# Patient Record
Sex: FEMALE | Race: WHITE | Employment: OTHER | URBAN - METROPOLITAN AREA
[De-identification: names, ages, dates, MRNs, and addresses within clinical notes are randomized per-mention and may not be internally consistent; named-entity substitution may affect disease eponyms.]

---

## 2018-06-04 ENCOUNTER — HOSPITAL ENCOUNTER (OUTPATIENT)
Dept: MAMMOGRAPHY | Age: 68
Discharge: HOME | End: 2018-06-04
Attending: FAMILY MEDICINE
Payer: MEDICARE

## 2018-06-04 DIAGNOSIS — Z13.820 SCREENING FOR OSTEOPOROSIS: ICD-10-CM

## 2018-06-04 DIAGNOSIS — M89.9 DISORDER OF BONE: ICD-10-CM

## 2018-06-04 DIAGNOSIS — M85.80 OSTEOPENIA, UNSPECIFIED LOCATION: ICD-10-CM

## 2018-06-04 PROCEDURE — 77080 DXA BONE DENSITY AXIAL: CPT

## 2018-08-20 ENCOUNTER — HOSPITAL ENCOUNTER (OUTPATIENT)
Dept: CT IMAGING | Age: 68
Discharge: HOME | End: 2018-08-20
Attending: FAMILY MEDICINE
Payer: MEDICARE

## 2018-08-20 DIAGNOSIS — G44.319 ACUTE POST-TRAUMATIC HEADACHE, NOT INTRACTABLE: ICD-10-CM

## 2018-08-20 PROCEDURE — 70450 CT HEAD/BRAIN W/O DYE: CPT

## 2019-05-09 ENCOUNTER — HOSPITAL ENCOUNTER (OUTPATIENT)
Dept: LAB | Age: 69
Discharge: HOME | End: 2019-05-09
Payer: MEDICARE

## 2019-05-09 LAB
ALBUMIN SERPL-MCNC: 3.7 G/DL (ref 3.2–4.6)
ALBUMIN/GLOB SERPL: 1.2 {RATIO} (ref 1.2–3.5)
ALP SERPL-CCNC: 53 U/L (ref 50–136)
ALT SERPL-CCNC: 26 U/L (ref 12–65)
AST SERPL-CCNC: 23 U/L (ref 15–37)
BILIRUB DIRECT SERPL-MCNC: 0.3 MG/DL
BILIRUB SERPL-MCNC: 1.1 MG/DL (ref 0.2–1.1)
GLOBULIN SER CALC-MCNC: 3.2 G/DL (ref 2.3–3.5)
PROT SERPL-MCNC: 6.9 G/DL (ref 6.3–8.2)

## 2019-05-09 PROCEDURE — 36415 COLL VENOUS BLD VENIPUNCTURE: CPT

## 2019-05-09 PROCEDURE — 80076 HEPATIC FUNCTION PANEL: CPT

## 2019-07-03 ENCOUNTER — HOSPITAL ENCOUNTER (OUTPATIENT)
Dept: LAB | Age: 69
Discharge: HOME | End: 2019-07-03
Payer: MEDICARE

## 2019-07-03 LAB
ALBUMIN SERPL-MCNC: 3.5 G/DL (ref 3.2–4.6)
ALBUMIN/GLOB SERPL: 1.1 {RATIO} (ref 1.2–3.5)
ALP SERPL-CCNC: 54 U/L (ref 50–136)
ALT SERPL-CCNC: 22 U/L (ref 12–65)
AST SERPL-CCNC: 22 U/L (ref 15–37)
BILIRUB DIRECT SERPL-MCNC: 0.2 MG/DL
BILIRUB SERPL-MCNC: 1.1 MG/DL (ref 0.2–1.1)
GLOBULIN SER CALC-MCNC: 3.3 G/DL (ref 2.3–3.5)
PROT SERPL-MCNC: 6.8 G/DL (ref 6.3–8.2)

## 2019-07-03 PROCEDURE — 80076 HEPATIC FUNCTION PANEL: CPT

## 2019-07-03 PROCEDURE — 36415 COLL VENOUS BLD VENIPUNCTURE: CPT

## 2020-12-10 PROBLEM — H69.90 EUSTACHIAN TUBE DISORDER: Status: ACTIVE | Noted: 2020-12-10

## 2021-01-14 ENCOUNTER — HOSPITAL ENCOUNTER (OUTPATIENT)
Dept: MAMMOGRAPHY | Age: 71
Discharge: HOME | End: 2021-01-14
Attending: INTERNAL MEDICINE
Payer: MEDICARE

## 2021-01-14 DIAGNOSIS — Z78.0 POSTMENOPAUSAL STATE: ICD-10-CM

## 2021-01-14 PROCEDURE — 77080 DXA BONE DENSITY AXIAL: CPT

## 2021-01-20 NOTE — PROGRESS NOTES
Please call the patient regarding her abnormal result. DEXA scan shows Osteoporosis at the hip. Please refer patient to [facility] for management of Osteoporosis if ok with patient.

## 2021-03-11 PROBLEM — M81.0 AGE-RELATED OSTEOPOROSIS WITHOUT CURRENT PATHOLOGICAL FRACTURE: Status: ACTIVE | Noted: 2021-03-11

## 2021-05-03 ENCOUNTER — HOSPITAL ENCOUNTER (EMERGENCY)
Age: 71
Discharge: HOME | End: 2021-05-03
Attending: EMERGENCY MEDICINE
Payer: MEDICARE

## 2021-05-03 VITALS
HEIGHT: 63 IN | TEMPERATURE: 99.9 F | SYSTOLIC BLOOD PRESSURE: 123 MMHG | HEART RATE: 86 BPM | RESPIRATION RATE: 18 BRPM | OXYGEN SATURATION: 97 % | WEIGHT: 102 LBS | DIASTOLIC BLOOD PRESSURE: 67 MMHG | BODY MASS INDEX: 18.07 KG/M2

## 2021-05-03 DIAGNOSIS — R19.7 DIARRHEA, UNSPECIFIED TYPE: Primary | ICD-10-CM

## 2021-05-03 LAB
ALBUMIN SERPL-MCNC: 4 G/DL (ref 3.2–4.6)
ALBUMIN/GLOB SERPL: 1.3 {RATIO} (ref 1.2–3.5)
ALP SERPL-CCNC: 53 U/L (ref 50–136)
ALT SERPL-CCNC: 13 U/L (ref 12–65)
ANION GAP SERPL CALC-SCNC: 6 MMOL/L (ref 7–16)
AST SERPL-CCNC: 20 U/L (ref 15–37)
BASOPHILS # BLD: 0 K/UL (ref 0–0.2)
BASOPHILS NFR BLD: 0 % (ref 0–2)
BILIRUB SERPL-MCNC: 1.8 MG/DL (ref 0.2–1.1)
BUN SERPL-MCNC: 11 MG/DL (ref 8–23)
CALCIUM SERPL-MCNC: 8.7 MG/DL (ref 8.3–10.4)
CHLORIDE SERPL-SCNC: 108 MMOL/L (ref 98–107)
CO2 SERPL-SCNC: 28 MMOL/L (ref 21–32)
CREAT SERPL-MCNC: 0.6 MG/DL (ref 0.6–1)
DIFFERENTIAL METHOD BLD: ABNORMAL
EOSINOPHIL # BLD: 0 K/UL (ref 0–0.8)
EOSINOPHIL NFR BLD: 0 % (ref 0.5–7.8)
ERYTHROCYTE [DISTWIDTH] IN BLOOD BY AUTOMATED COUNT: 12.6 % (ref 11.9–14.6)
GLOBULIN SER CALC-MCNC: 3 G/DL (ref 2.3–3.5)
GLUCOSE SERPL-MCNC: 91 MG/DL (ref 65–100)
HCT VFR BLD AUTO: 41.4 % (ref 35.8–46.3)
HGB BLD-MCNC: 13.8 G/DL (ref 11.7–15.4)
IMM GRANULOCYTES # BLD AUTO: 0 K/UL (ref 0–0.5)
IMM GRANULOCYTES NFR BLD AUTO: 0 % (ref 0–5)
LIPASE SERPL-CCNC: 148 U/L (ref 73–393)
LYMPHOCYTES # BLD: 0.9 K/UL (ref 0.5–4.6)
LYMPHOCYTES NFR BLD: 12 % (ref 13–44)
MCH RBC QN AUTO: 32 PG (ref 26.1–32.9)
MCHC RBC AUTO-ENTMCNC: 33.3 G/DL (ref 31.4–35)
MCV RBC AUTO: 96.1 FL (ref 79.6–97.8)
MONOCYTES # BLD: 0.4 K/UL (ref 0.1–1.3)
MONOCYTES NFR BLD: 5 % (ref 4–12)
NEUTS SEG # BLD: 6.2 K/UL (ref 1.7–8.2)
NEUTS SEG NFR BLD: 82 % (ref 43–78)
NRBC # BLD: 0 K/UL (ref 0–0.2)
PLATELET # BLD AUTO: 237 K/UL (ref 150–450)
PMV BLD AUTO: 9.8 FL (ref 9.4–12.3)
POTASSIUM SERPL-SCNC: 3.7 MMOL/L (ref 3.5–5.1)
PROT SERPL-MCNC: 7 G/DL (ref 6.3–8.2)
RBC # BLD AUTO: 4.31 M/UL (ref 4.05–5.2)
SODIUM SERPL-SCNC: 142 MMOL/L (ref 136–145)
WBC # BLD AUTO: 7.6 K/UL (ref 4.3–11.1)

## 2021-05-03 PROCEDURE — 74011250636 HC RX REV CODE- 250/636: Performed by: PHYSICIAN ASSISTANT

## 2021-05-03 PROCEDURE — 96361 HYDRATE IV INFUSION ADD-ON: CPT

## 2021-05-03 PROCEDURE — 83690 ASSAY OF LIPASE: CPT

## 2021-05-03 PROCEDURE — 81003 URINALYSIS AUTO W/O SCOPE: CPT

## 2021-05-03 PROCEDURE — 99283 EMERGENCY DEPT VISIT LOW MDM: CPT

## 2021-05-03 PROCEDURE — 96374 THER/PROPH/DIAG INJ IV PUSH: CPT

## 2021-05-03 PROCEDURE — 80053 COMPREHEN METABOLIC PANEL: CPT

## 2021-05-03 PROCEDURE — 85025 COMPLETE CBC W/AUTO DIFF WBC: CPT

## 2021-05-03 RX ORDER — ONDANSETRON HYDROCHLORIDE 8 MG/1
8 TABLET, FILM COATED ORAL
Qty: 30 TAB | Refills: 1 | Status: SHIPPED | OUTPATIENT
Start: 2021-05-03

## 2021-05-03 RX ORDER — ONDANSETRON 2 MG/ML
4 INJECTION INTRAMUSCULAR; INTRAVENOUS
Status: COMPLETED | OUTPATIENT
Start: 2021-05-03 | End: 2021-05-03

## 2021-05-03 RX ADMIN — SODIUM CHLORIDE 1000 ML: 900 INJECTION, SOLUTION INTRAVENOUS at 12:47

## 2021-05-03 RX ADMIN — ONDANSETRON 4 MG: 2 INJECTION INTRAMUSCULAR; INTRAVENOUS at 12:47

## 2021-05-03 NOTE — DISCHARGE INSTRUCTIONS
Use at home meds as directed, push fluids try to eat well-balanced diet see your primary care physician for routine recheck

## 2021-05-03 NOTE — ED NOTES
I have reviewed discharge instructions with the patient. The patient verbalized understanding. Patient left ED via Discharge Method: ambulatory to Home with self    Opportunity for questions and clarification provided. Patient given 1 scripts. To continue your aftercare when you leave the hospital, you may receive an automated call from our care team to check in on how you are doing. This is a free service and part of our promise to provide the best care and service to meet your aftercare needs.  If you have questions, or wish to unsubscribe from this service please call [phone]. Thank you for Choosing our [address] [place] [address] Emergency Department.

## 2021-05-03 NOTE — ED PROVIDER NOTES
Patient reports increased nervousness nausea over the past 3 days she did have 3 loose stools this morning. She was able to eat crackers only Saturday and Sunday due to nausea, she was able to drink some Gatorade. She denies any fever. She has a history of some anxiety and her physician did recently increase her dose of Lexapro. She states she feels she has been more shaky due to stress from taking care of her elderly mother. She denies any fever, no shortness of breath, no back pain no difficulty urinating no numbness or tingling into the arms or legs. No headache or blurred vision    The history is provided by the patient. Diarrhea   This is a new problem. The current episode started 6 to 12 hours ago. The problem occurs rarely. The problem has not changed since onset. The pain is associated with an unknown factor. The pain is at a severity of 0/10. The patient is experiencing no pain. Associated symptoms include diarrhea and nausea. Pertinent negatives include no vomiting, no dysuria and no frequency. Associated symptoms comments: Shaky . Exacerbated by: stress. The pain is relieved by nothing. Her past medical history does not include gallstones or diverticulitis. Past medical history comments: Gastroparesis. The patient's surgical history includes cholecystectomy.        Past Medical History:   Diagnosis Date    Depression     Gastroparesis     Dr. Contreras Speaks Hearing loss     Dr Vladimir Sam Osteopenia 8/30/2012    Osteoporosis     Otosclerosis     Rheumatic fever     Tinnitus 8/30/2012    Unspecified adverse effect of anesthesia     s/p EGD, c/o of pain in L eye - had to see MD, was told it was due to eyes not being closed during procedure       Past Surgical History:   Procedure Laterality Date    HX CHOLECYSTECTOMY      HX TONSIL AND ADENOIDECTOMY      HX TUBAL LIGATION           Family History:   Problem Relation Age of Onset    Hypertension Mother     Heart Disease Mother         \"clots in heart\"    Macular Degen Mother     Osteoporosis Mother     Heart Disease Father         arrythmia    Prostate Cancer Father     Glaucoma Father     Diabetes Maternal Aunt     Diabetes Maternal Aunt     Stroke Maternal Aunt     Diabetes Maternal Aunt     No Known Problems Maternal Grandmother     No Known Problems Maternal Grandfather     Osteoporosis Paternal Grandmother     No Known Problems Paternal Grandfather     Colon Cancer Neg Hx     Malignant Hyperthermia Neg Hx     Pseudocholinesterase Deficiency Neg Hx     Post-op Nausea/Vomiting Neg Hx     Delayed Awakening Neg Hx     Emergence Delirium Neg Hx     Post-op Cognitive Dysfunction Neg Hx     Other Neg Hx        Social History     Socioeconomic History    Marital status:      Spouse name: Not on file    Number of children: Not on file    Years of education: Not on file    Highest education level: Not on file   Occupational History    Not on file   Social Needs    Financial resource strain: Not on file    Food insecurity     Worry: Not on file     Inability: Not on file    Transportation needs     Medical: Not on file     Non-medical: Not on file   Tobacco Use    Smoking status: Never Smoker    Smokeless tobacco: Never Used   Substance and Sexual Activity    Alcohol use: No    Drug use: No    Sexual activity: Never     Birth control/protection: None   Lifestyle    Physical activity     Days per week: Not on file     Minutes per session: Not on file    Stress: Not on file   Relationships    Social connections     Talks on phone: Not on file     Gets together: Not on file     Attends Yarsani service: Not on file     Active member of club or organization: Not on file     Attends meetings of clubs or organizations: Not on file     Relationship status: Not on file    Intimate partner violence     Fear of current or ex partner: Not on file     Emotionally abused: Not on file     Physically abused: Not on file Forced sexual activity: Not on file   Other Topics Concern    Not on file   Social History Narrative    Retired// 2 children         ALLERGIES: Macrolide antibiotics, Metoclopramide hcl, Penicillin, Adhesive, Clarithromycin, Erythromycin, Penicillins, and Reglan [metoclopramide]    Review of Systems   Gastrointestinal: Positive for diarrhea and nausea. Negative for vomiting. Genitourinary: Negative for dysuria and frequency. All other systems reviewed and are negative. Vitals:    05/03/21 1104   BP: 123/67   Pulse: 86   Resp: 18   Temp: 99.9 °F (37.7 °C)   SpO2: 97%   Weight: 46.3 kg (102 lb)   Height: 5' 3\" (1.6 m)            Physical Exam  Vitals signs and nursing note reviewed. Constitutional:       General: She is not in acute distress. Appearance: Normal appearance. She is well-developed. She is not diaphoretic. Comments: anxious   HENT:      Head: Normocephalic and atraumatic. Right Ear: Tympanic membrane normal.      Left Ear: Tympanic membrane normal.      Mouth/Throat:      Mouth: Mucous membranes are moist.   Eyes:      Pupils: Pupils are equal, round, and reactive to light. Neck:      Musculoskeletal: Normal range of motion and neck supple. Cardiovascular:      Rate and Rhythm: Normal rate and regular rhythm. Pulmonary:      Effort: Pulmonary effort is normal.      Breath sounds: Normal breath sounds. No wheezing or rhonchi. Abdominal:      General: Abdomen is flat. Bowel sounds are normal.      Palpations: Abdomen is soft. Tenderness: There is no abdominal tenderness. Hernia: No hernia is present. Musculoskeletal: Normal range of motion. Skin:     General: Skin is warm. Neurological:      General: No focal deficit present. Mental Status: She is alert and oriented to person, place, and time.           MDM  Number of Diagnoses or Management Options  Diagnosis management comments: Cbc, cmp, lipase normal,pt feeling better s/p 1 liter ns and 4 mg zofran,no stools in er, no fever  Pt  Requests rx for zofran pills not odt, pt to see pmd for recheck       Amount and/or Complexity of Data Reviewed  Clinical lab tests: ordered and reviewed  Review and summarize past medical records: yes    Risk of Complications, Morbidity, and/or Mortality  Presenting problems: moderate  Diagnostic procedures: moderate  Management options: low    Patient Progress  Patient progress: improved         Procedures

## 2021-08-11 ENCOUNTER — APPOINTMENT (RX ONLY)
Dept: URBAN - METROPOLITAN AREA CLINIC 24 | Facility: CLINIC | Age: 71
Setting detail: DERMATOLOGY
End: 2021-08-11

## 2021-08-11 DIAGNOSIS — D18.0 HEMANGIOMA: ICD-10-CM

## 2021-08-11 DIAGNOSIS — L57.8 OTHER SKIN CHANGES DUE TO CHRONIC EXPOSURE TO NONIONIZING RADIATION: ICD-10-CM

## 2021-08-11 DIAGNOSIS — L82.1 OTHER SEBORRHEIC KERATOSIS: ICD-10-CM

## 2021-08-11 DIAGNOSIS — L72.0 EPIDERMAL CYST: ICD-10-CM

## 2021-08-11 DIAGNOSIS — D22 MELANOCYTIC NEVI: ICD-10-CM

## 2021-08-11 PROBLEM — D18.01 HEMANGIOMA OF SKIN AND SUBCUTANEOUS TISSUE: Status: ACTIVE | Noted: 2021-08-11

## 2021-08-11 PROBLEM — D22.4 MELANOCYTIC NEVI OF SCALP AND NECK: Status: ACTIVE | Noted: 2021-08-11

## 2021-08-11 PROCEDURE — ? PRESCRIPTION

## 2021-08-11 PROCEDURE — ? TREATMENT REGIMEN

## 2021-08-11 PROCEDURE — ? COUNSELING

## 2021-08-11 PROCEDURE — 99203 OFFICE O/P NEW LOW 30 MIN: CPT

## 2021-08-11 PROCEDURE — ? RECOMMENDATIONS

## 2021-08-11 RX ORDER — TRETIONIN 0.25 MG/G
CREAM TOPICAL
Qty: 1 | Refills: 7 | Status: ERX | COMMUNITY
Start: 2021-08-11

## 2021-08-11 RX ADMIN — TRETIONIN: 0.25 CREAM TOPICAL at 00:00

## 2021-08-11 ASSESSMENT — LOCATION DETAILED DESCRIPTION DERM
LOCATION DETAILED: RIGHT MEDIAL FOREHEAD
LOCATION DETAILED: LEFT SUPERIOR FOREHEAD
LOCATION DETAILED: RIGHT INFERIOR FOREHEAD
LOCATION DETAILED: RIGHT RIB CAGE
LOCATION DETAILED: LEFT SUPERIOR MEDIAL MIDBACK
LOCATION DETAILED: RIGHT DISTAL PRETIBIAL REGION
LOCATION DETAILED: RIGHT FOREHEAD
LOCATION DETAILED: RIGHT MID-UPPER BACK
LOCATION DETAILED: LEFT FOREHEAD
LOCATION DETAILED: LEFT MEDIAL FOREHEAD
LOCATION DETAILED: LEFT INFERIOR LATERAL FOREHEAD
LOCATION DETAILED: SUBXIPHOID
LOCATION DETAILED: MID TRAPEZIAL NECK

## 2021-08-11 ASSESSMENT — LOCATION SIMPLE DESCRIPTION DERM
LOCATION SIMPLE: RIGHT UPPER BACK
LOCATION SIMPLE: ABDOMEN
LOCATION SIMPLE: RIGHT PRETIBIAL REGION
LOCATION SIMPLE: LEFT LOWER BACK
LOCATION SIMPLE: TRAPEZIAL NECK
LOCATION SIMPLE: LEFT FOREHEAD
LOCATION SIMPLE: RIGHT FOREHEAD

## 2021-08-11 ASSESSMENT — LOCATION ZONE DERM
LOCATION ZONE: TRUNK
LOCATION ZONE: NECK
LOCATION ZONE: LEG
LOCATION ZONE: FACE

## 2021-08-11 NOTE — PROCEDURE: RECOMMENDATIONS
Detail Level: Zone
Render Risk Assessment In Note?: no
Recommendation Preamble: The following recommendations were made during the visit: see [facility] for treatment options, can try Tretinoin cream nightly

## 2021-08-11 NOTE — PROCEDURE: TREATMENT REGIMEN
Initiate Treatment: Tretinoin cream 0.025% every other night. Use Cetaphil or CeraVe
Detail Level: Zone

## 2022-03-18 PROBLEM — H69.90 EUSTACHIAN TUBE DISORDER: Status: ACTIVE | Noted: 2020-12-10

## 2022-03-20 PROBLEM — M81.0 AGE-RELATED OSTEOPOROSIS WITHOUT CURRENT PATHOLOGICAL FRACTURE: Status: ACTIVE | Noted: 2021-03-11

## 2022-03-25 LAB — MAMMOGRAPHY, EXTERNAL: NORMAL

## 2022-06-17 ENCOUNTER — APPOINTMENT (RX ONLY)
Dept: URBAN - METROPOLITAN AREA CLINIC 23 | Facility: CLINIC | Age: 72
Setting detail: DERMATOLOGY
End: 2022-06-17

## 2022-06-17 DIAGNOSIS — Z41.9 ENCOUNTER FOR PROCEDURE FOR PURPOSES OTHER THAN REMEDYING HEALTH STATE, UNSPECIFIED: ICD-10-CM

## 2022-06-17 PROCEDURE — ? ACNE SURGERY

## 2022-06-17 PROCEDURE — 10040 EXTRACTION: CPT

## 2022-06-17 ASSESSMENT — LOCATION DETAILED DESCRIPTION DERM
LOCATION DETAILED: RIGHT MEDIAL FOREHEAD
LOCATION DETAILED: LEFT INFERIOR FOREHEAD
LOCATION DETAILED: LEFT INFERIOR CENTRAL MALAR CHEEK
LOCATION DETAILED: RIGHT INFERIOR CENTRAL MALAR CHEEK

## 2022-06-17 ASSESSMENT — LOCATION SIMPLE DESCRIPTION DERM
LOCATION SIMPLE: RIGHT CHEEK
LOCATION SIMPLE: LEFT FOREHEAD
LOCATION SIMPLE: RIGHT FOREHEAD
LOCATION SIMPLE: LEFT CHEEK

## 2022-06-17 ASSESSMENT — LOCATION ZONE DERM: LOCATION ZONE: FACE

## 2022-06-17 NOTE — PROCEDURE: ACNE SURGERY
Render Post-Care Instructions In Note?: no
Consent was obtained and risks were reviewed including but not limited to scarring, infection, bleeding, scabbing, incomplete removal, and allergy to anesthesia.
Extraction Method: 11 blade and comedo extractor
Acne Type: Comedonal Lesions
Detail Level: Detailed
Prep Text (Optional): Prior to removal the treatment areas were prepped in the usual\\nextractions millia
Post-Care Instructions: I reviewed with the patient in detail post-care instructions. Patient is to keep the treatment areas dry overnight, and then apply bacitracin twice daily until healed. Patient may apply hydrogen peroxide soaks to remove any crusting.

## 2022-06-17 NOTE — HPI: COSMETIC (LASER RED SPOTS)
Additional History: exractions on severe millia \\nforehead was great but couldnt get out alot on lower face\\nsuggested 8-10 weeks

## 2022-07-27 ENCOUNTER — TELEPHONE (OUTPATIENT)
Dept: INTERNAL MEDICINE CLINIC | Facility: CLINIC | Age: 72
End: 2022-07-27

## 2022-07-27 NOTE — TELEPHONE ENCOUNTER
The patient stated that she has been exposed to COVID-19 twice. 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 She doesn't know what to do. Please call her back at [phone].

## 2022-08-10 ENCOUNTER — TELEPHONE (OUTPATIENT)
Dept: INTERNAL MEDICINE CLINIC | Facility: CLINIC | Age: 72
End: 2022-08-10

## 2022-08-10 DIAGNOSIS — E78.49 OTHER HYPERLIPIDEMIA: ICD-10-CM

## 2022-08-10 DIAGNOSIS — F41.8 ANXIETY WITH DEPRESSION: Primary | ICD-10-CM

## 2022-08-16 DIAGNOSIS — E78.49 OTHER HYPERLIPIDEMIA: ICD-10-CM

## 2022-08-16 DIAGNOSIS — F41.8 ANXIETY WITH DEPRESSION: ICD-10-CM

## 2022-08-16 LAB
ALBUMIN SERPL-MCNC: 3.6 G/DL (ref 3.2–4.6)
ALBUMIN/GLOB SERPL: 1.2 {RATIO} (ref 1.2–3.5)
ALP SERPL-CCNC: 51 U/L (ref 50–136)
ALT SERPL-CCNC: 20 U/L (ref 12–65)
ANION GAP SERPL CALC-SCNC: ABNORMAL MMOL/L (ref 7–16)
AST SERPL-CCNC: 22 U/L (ref 15–37)
BASOPHILS # BLD: 0.1 K/UL (ref 0–0.2)
BASOPHILS NFR BLD: 1 % (ref 0–2)
BILIRUB SERPL-MCNC: 0.8 MG/DL (ref 0.2–1.1)
BUN SERPL-MCNC: 14 MG/DL (ref 8–23)
CALCIUM SERPL-MCNC: 8.9 MG/DL (ref 8.3–10.4)
CHLORIDE SERPL-SCNC: 111 MMOL/L (ref 98–107)
CHOLEST SERPL-MCNC: 193 MG/DL
CO2 SERPL-SCNC: 31 MMOL/L (ref 21–32)
CREAT SERPL-MCNC: 0.7 MG/DL (ref 0.6–1)
DIFFERENTIAL METHOD BLD: ABNORMAL
EOSINOPHIL # BLD: 0.3 K/UL (ref 0–0.8)
EOSINOPHIL NFR BLD: 4 % (ref 0.5–7.8)
ERYTHROCYTE [DISTWIDTH] IN BLOOD BY AUTOMATED COUNT: 13.1 % (ref 11.9–14.6)
GLOBULIN SER CALC-MCNC: 2.9 G/DL (ref 2.3–3.5)
GLUCOSE SERPL-MCNC: 87 MG/DL (ref 65–100)
HCT VFR BLD AUTO: 40.6 % (ref 35.8–46.3)
HDLC SERPL-MCNC: 70 MG/DL (ref 40–60)
HDLC SERPL: 2.8 {RATIO}
HGB BLD-MCNC: 13.1 G/DL (ref 11.7–15.4)
IMM GRANULOCYTES # BLD AUTO: 0 K/UL (ref 0–0.5)
IMM GRANULOCYTES NFR BLD AUTO: 0 % (ref 0–5)
LDLC SERPL CALC-MCNC: 108.4 MG/DL
LYMPHOCYTES # BLD: 1.5 K/UL (ref 0.5–4.6)
LYMPHOCYTES NFR BLD: 24 % (ref 13–44)
MCH RBC QN AUTO: 32.6 PG (ref 26.1–32.9)
MCHC RBC AUTO-ENTMCNC: 32.3 G/DL (ref 31.4–35)
MCV RBC AUTO: 101 FL (ref 79.6–97.8)
MONOCYTES # BLD: 0.6 K/UL (ref 0.1–1.3)
MONOCYTES NFR BLD: 10 % (ref 4–12)
NEUTS SEG # BLD: 3.9 K/UL (ref 1.7–8.2)
NEUTS SEG NFR BLD: 61 % (ref 43–78)
NRBC # BLD: 0 K/UL (ref 0–0.2)
PLATELET # BLD AUTO: 236 K/UL (ref 150–450)
PMV BLD AUTO: 10 FL (ref 9.4–12.3)
POTASSIUM SERPL-SCNC: 3.7 MMOL/L (ref 3.5–5.1)
PROT SERPL-MCNC: 6.5 G/DL (ref 6.3–8.2)
RBC # BLD AUTO: 4.02 M/UL (ref 4.05–5.2)
SODIUM SERPL-SCNC: 138 MMOL/L (ref 136–145)
TRIGL SERPL-MCNC: 73 MG/DL (ref 35–150)
VLDLC SERPL CALC-MCNC: 14.6 MG/DL (ref 6–23)
WBC # BLD AUTO: 6.3 K/UL (ref 4.3–11.1)

## 2022-08-18 ENCOUNTER — APPOINTMENT (RX ONLY)
Dept: URBAN - METROPOLITAN AREA CLINIC 23 | Facility: CLINIC | Age: 72
Setting detail: DERMATOLOGY
End: 2022-08-18

## 2022-08-18 DIAGNOSIS — Z41.9 ENCOUNTER FOR PROCEDURE FOR PURPOSES OTHER THAN REMEDYING HEALTH STATE, UNSPECIFIED: ICD-10-CM

## 2022-08-18 PROCEDURE — ? ACNE SURGERY

## 2022-08-18 PROCEDURE — 10040 EXTRACTION: CPT

## 2022-08-18 ASSESSMENT — LOCATION DETAILED DESCRIPTION DERM
LOCATION DETAILED: LEFT INFERIOR CENTRAL MALAR CHEEK
LOCATION DETAILED: LEFT FOREHEAD
LOCATION DETAILED: LEFT CHIN
LOCATION DETAILED: RIGHT INFERIOR CENTRAL MALAR CHEEK
LOCATION DETAILED: RIGHT FOREHEAD

## 2022-08-18 ASSESSMENT — LOCATION SIMPLE DESCRIPTION DERM
LOCATION SIMPLE: CHIN
LOCATION SIMPLE: RIGHT CHEEK
LOCATION SIMPLE: LEFT CHEEK
LOCATION SIMPLE: LEFT FOREHEAD
LOCATION SIMPLE: RIGHT FOREHEAD

## 2022-08-18 ASSESSMENT — LOCATION ZONE DERM: LOCATION ZONE: FACE

## 2022-08-18 NOTE — PROCEDURE: ACNE SURGERY
Render Post-Care Instructions In Note?: no
Post-Care Instructions: I reviewed with the patient in detail post-care instructions. Patient is to keep the treatment areas dry overnight, and then apply bacitracin twice daily until healed. Patient may apply hydrogen peroxide soaks to remove any crusting.
Detail Level: Detailed
Consent was obtained and risks were reviewed including but not limited to scarring, infection, bleeding, scabbing, incomplete removal, and allergy to anesthesia.
Prep Text (Optional): Prior to removal the treatment areas were prepped in the usual\\nextractions millia /cyst
Extraction Method: 11 blade and comedo extractor
Acne Type: Comedonal Lesions

## 2022-08-18 NOTE — HPI: COSMETIC (LASER RED SPOTS)
Additional History: USED 11 BLADE AND STERILE QTIPS\\nFOREHEAD EXTRACTED EASIER THAN LOWER FACE\\nSOME MILLIA HAVE FORMED INTO MEAN DEEP CYYSTS\\n\\nDID SUGGEST PLASTIC\\nBUT WANT HER BACK ON TRET\\n gave her samples of elta cleanser and skin reocvery light oil free moisuzier\\nwnat her to stop using all her current hair and face products\\nceterphil should be fine\\nbut told her for to be plain landy with everythingelse \\ndid get some dark spots possible from extractions\\n\\nif she gets more do not do any more extractions!

## 2022-08-26 ENCOUNTER — OFFICE VISIT (OUTPATIENT)
Dept: INTERNAL MEDICINE CLINIC | Facility: CLINIC | Age: 72
End: 2022-08-26
Payer: MEDICARE

## 2022-08-26 VITALS
HEART RATE: 75 BPM | BODY MASS INDEX: 18.25 KG/M2 | SYSTOLIC BLOOD PRESSURE: 106 MMHG | DIASTOLIC BLOOD PRESSURE: 60 MMHG | OXYGEN SATURATION: 98 % | WEIGHT: 103 LBS

## 2022-08-26 DIAGNOSIS — F41.8 ANXIETY WITH DEPRESSION: Primary | ICD-10-CM

## 2022-08-26 DIAGNOSIS — E78.49 OTHER HYPERLIPIDEMIA: ICD-10-CM

## 2022-08-26 DIAGNOSIS — K31.84 GASTROPARESIS: ICD-10-CM

## 2022-08-26 PROCEDURE — G8399 PT W/DXA RESULTS DOCUMENT: HCPCS | Performed by: INTERNAL MEDICINE

## 2022-08-26 PROCEDURE — 3017F COLORECTAL CA SCREEN DOC REV: CPT | Performed by: INTERNAL MEDICINE

## 2022-08-26 PROCEDURE — 1036F TOBACCO NON-USER: CPT | Performed by: INTERNAL MEDICINE

## 2022-08-26 PROCEDURE — G8419 CALC BMI OUT NRM PARAM NOF/U: HCPCS | Performed by: INTERNAL MEDICINE

## 2022-08-26 PROCEDURE — 99214 OFFICE O/P EST MOD 30 MIN: CPT | Performed by: INTERNAL MEDICINE

## 2022-08-26 PROCEDURE — 1123F ACP DISCUSS/DSCN MKR DOCD: CPT | Performed by: INTERNAL MEDICINE

## 2022-08-26 PROCEDURE — 1090F PRES/ABSN URINE INCON ASSESS: CPT | Performed by: INTERNAL MEDICINE

## 2022-08-26 PROCEDURE — G8427 DOCREV CUR MEDS BY ELIG CLIN: HCPCS | Performed by: INTERNAL MEDICINE

## 2022-08-26 RX ORDER — TRIAMCINOLONE ACETONIDE 10 MG/ML
10 INJECTION, SUSPENSION INTRA-ARTICULAR; INTRALESIONAL ONCE
COMMUNITY

## 2022-08-26 RX ORDER — PAROXETINE 30 MG/1
30 TABLET, FILM COATED ORAL DAILY
Qty: 90 TABLET | Refills: 3 | Status: SHIPPED | OUTPATIENT
Start: 2022-08-26

## 2022-08-26 ASSESSMENT — PATIENT HEALTH QUESTIONNAIRE - PHQ9
2. FEELING DOWN, DEPRESSED OR HOPELESS: 0
SUM OF ALL RESPONSES TO PHQ QUESTIONS 1-9: 0
4. FEELING TIRED OR HAVING LITTLE ENERGY: 0
8. MOVING OR SPEAKING SO SLOWLY THAT OTHER PEOPLE COULD HAVE NOTICED. OR THE OPPOSITE, BEING SO FIGETY OR RESTLESS THAT YOU HAVE BEEN MOVING AROUND A LOT MORE THAN USUAL: 0
6. FEELING BAD ABOUT YOURSELF - OR THAT YOU ARE A FAILURE OR HAVE LET YOURSELF OR YOUR FAMILY DOWN: 0
7. TROUBLE CONCENTRATING ON THINGS, SUCH AS READING THE NEWSPAPER OR WATCHING TELEVISION: 0
SUM OF ALL RESPONSES TO PHQ QUESTIONS 1-9: 0
1. LITTLE INTEREST OR PLEASURE IN DOING THINGS: 0
5. POOR APPETITE OR OVEREATING: 0
9. THOUGHTS THAT YOU WOULD BE BETTER OFF DEAD, OR OF HURTING YOURSELF: 0
SUM OF ALL RESPONSES TO PHQ9 QUESTIONS 1 & 2: 0
3. TROUBLE FALLING OR STAYING ASLEEP: 0
10. IF YOU CHECKED OFF ANY PROBLEMS, HOW DIFFICULT HAVE THESE PROBLEMS MADE IT FOR YOU TO DO YOUR WORK, TAKE CARE OF THINGS AT HOME, OR GET ALONG WITH OTHER PEOPLE: 0

## 2022-08-26 ASSESSMENT — ENCOUNTER SYMPTOMS
COUGH: 0
SHORTNESS OF BREATH: 0
ABDOMINAL PAIN: 0

## 2022-08-26 NOTE — PROGRESS NOTES
SUBJECTIVE:   Chase Hilliard is a 66 y.o. 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 Pittman [facility]  Has not had colonoscopy yet - plans to call GI for a 2 day prep  Patient wants to wait on Shingles vaccine at this time     Past Medical History, Past Surgical History, Family history, Social History, and Medications were all reviewed with the patient today and updated as necessary. Current Outpatient Medications   Medication Sig Dispense Refill    triamcinolone acetonide (KENALOG) 10 MG/ML injection Inject 10 mg into the articular space once      PARoxetine (PAXIL) 30 MG tablet Take 1 tablet by mouth daily 90 tablet 3    calcium carbonate-vitamin D (CALTRATE) 600-400 MG-UNIT TABS per tab Take 1 tablet by mouth 2 times daily      loratadine (CLARITIN) 10 MG tablet Take 10 mg by mouth daily      ondansetron (ZOFRAN) 8 MG tablet Take 8 mg by mouth      tretinoin (RETIN-A) 0.025 % cream APPLY PEA SIZE AMOUNT TO THE FACE NIGHTLY, START EVERY THIRD NIGHT AND INCREASE AS TOLERATED      zoledronic acid (RECLAST) 5 MG/100ML SOLN Infuse 5 mg intravenously once       No current facility-administered medications for this visit.      Allergies   Allergen Reactions    Metoclopramide Other (See Comments)     CNS reaction(s)  Agitation      Penicillins Rash    Clarithromycin Other (See Comments)     Due to domperidone    Erythromycin Other (See Comments)     Due to domperidone    Adhesive Tape Rash     Patient Active Problem List   Diagnosis    Gastroparesis    Eustachian tube disorder    Osteopenia    IBS (irritable bowel syndrome)    Tinnitus    Anxiety with depression Constitutional:       General: She is not in acute distress. Cardiovascular:      Rate and Rhythm: Normal rate and regular rhythm. Pulmonary:      Effort: Pulmonary effort is normal.      Breath sounds: No wheezing. Neurological:      General: No focal deficit present. Mental Status: She is oriented to person, place, and time. Psychiatric:         Mood and Affect: Mood normal.         Behavior: Behavior normal.       Medical problems and test results were reviewed with the patient today.      Recent Results (from the past 672 hour(s))   Lipid Panel    Collection Time: 08/16/22  3:00 PM   Result Value Ref Range    Cholesterol, Total 193 <200 MG/DL    Triglycerides 73 35 - 150 MG/DL    HDL 70 (H) 40 - 60 MG/DL    LDL Calculated 108.4 (H) <100 MG/DL    VLDL Cholesterol Calculated 14.6 6.0 - 23.0 MG/DL    Chol/HDL Ratio 2.8     CBC with Auto Differential    Collection Time: 08/16/22  3:00 PM   Result Value Ref Range    WBC 6.3 4.3 - 11.1 K/uL    RBC 4.02 (L) 4.05 - 5.2 M/uL    Hemoglobin 13.1 11.7 - 15.4 g/dL    Hematocrit 40.6 35.8 - 46.3 %    .0 (H) 79.6 - 97.8 FL    MCH 32.6 26.1 - 32.9 PG    MCHC 32.3 31.4 - 35.0 g/dL    RDW 13.1 11.9 - 14.6 %    Platelets 688 411 - 171 K/uL    MPV 10.0 9.4 - 12.3 FL    nRBC 0.00 0.0 - 0.2 K/uL    Differential Type AUTOMATED      Seg Neutrophils 61 43 - 78 %    Lymphocytes 24 13 - 44 %    Monocytes 10 4.0 - 12.0 %    Eosinophils % 4 0.5 - 7.8 %    Basophils 1 0.0 - 2.0 %    Immature Granulocytes 0 0.0 - 5.0 %    Segs Absolute 3.9 1.7 - 8.2 K/UL    Absolute Lymph # 1.5 0.5 - 4.6 K/UL    Absolute Mono # 0.6 0.1 - 1.3 K/UL    Absolute Eos # 0.3 0.0 - 0.8 K/UL    Basophils Absolute 0.1 0.0 - 0.2 K/UL    Absolute Immature Granulocyte 0.0 0.0 - 0.5 K/UL   Comprehensive Metabolic Panel    Collection Time: 08/16/22  3:00 PM   Result Value Ref Range    Sodium 138 136 - 145 mmol/L    Potassium 3.7 3.5 - 5.1 mmol/L    Chloride 111 (H) 98 - 107 mmol/L    CO2 31 21 - 32 mmol/L    Anion Gap Cannot be calculated 7 - 16 mmol/L    Glucose 87 65 - 100 mg/dL    BUN 14 8 - 23 MG/DL    Creatinine 0.70 0.6 - 1.0 MG/DL    GFR African American >60 >60 ml/min/1.73m2    GFR Non- >60 >60 ml/min/1.73m2    Calcium 8.9 8.3 - 10.4 MG/DL    Total Bilirubin 0.8 0.2 - 1.1 MG/DL    ALT 20 12 - 65 U/L    AST 22 15 - 37 U/L    Alk Phosphatase 51 50 - 136 U/L    Total Protein 6.5 6.3 - 8.2 g/dL    Albumin 3.6 3.2 - 4.6 g/dL    Globulin 2.9 2.3 - 3.5 g/dL    Albumin/Globulin Ratio 1.2 1.2 - 3.5           ASSESSMENT and PLAN    Elia Sandoval was seen today for follow-up. Diagnoses and all orders for this visit:    Anxiety with depression  -     Comprehensive Metabolic Panel; Future  -     TSH; Future    Other hyperlipidemia  -     Lipid Panel; Future    Gastroparesis  -     CBC with Auto Differential; Future    Other orders  -     PARoxetine (PAXIL) 30 MG tablet; Take 1 tablet by mouth daily        Return in about 6 months (around 2/26/2023) for Medicare Wellness Visit( labs ~ 3 days before).

## 2022-11-03 ENCOUNTER — RX ONLY (OUTPATIENT)
Age: 72
Setting detail: RX ONLY
End: 2022-11-03

## 2022-11-03 RX ORDER — TRETIONIN 0.25 MG/G
CREAM TOPICAL
Qty: 45 | Refills: 6 | Status: ERX

## 2022-11-21 DIAGNOSIS — K31.84 GASTROPARESIS: ICD-10-CM

## 2022-11-21 DIAGNOSIS — F41.8 ANXIETY WITH DEPRESSION: ICD-10-CM

## 2022-11-21 DIAGNOSIS — E78.49 OTHER HYPERLIPIDEMIA: ICD-10-CM

## 2022-11-21 LAB
ALBUMIN SERPL-MCNC: 3.7 G/DL (ref 3.2–4.6)
ALBUMIN/GLOB SERPL: 1.4 {RATIO} (ref 0.4–1.6)
ALP SERPL-CCNC: 47 U/L (ref 50–136)
ALT SERPL-CCNC: 16 U/L (ref 12–65)
ANION GAP SERPL CALC-SCNC: 4 MMOL/L (ref 2–11)
AST SERPL-CCNC: 18 U/L (ref 15–37)
BASOPHILS # BLD: 0 K/UL (ref 0–0.2)
BASOPHILS NFR BLD: 1 % (ref 0–2)
BILIRUB SERPL-MCNC: 1.2 MG/DL (ref 0.2–1.1)
BUN SERPL-MCNC: 18 MG/DL (ref 8–23)
CALCIUM SERPL-MCNC: 9.4 MG/DL (ref 8.3–10.4)
CHLORIDE SERPL-SCNC: 109 MMOL/L (ref 101–110)
CHOLEST SERPL-MCNC: 199 MG/DL
CO2 SERPL-SCNC: 29 MMOL/L (ref 21–32)
CREAT SERPL-MCNC: 0.7 MG/DL (ref 0.6–1)
DIFFERENTIAL METHOD BLD: NORMAL
EOSINOPHIL # BLD: 0.2 K/UL (ref 0–0.8)
EOSINOPHIL NFR BLD: 3 % (ref 0.5–7.8)
ERYTHROCYTE [DISTWIDTH] IN BLOOD BY AUTOMATED COUNT: 12.4 % (ref 11.9–14.6)
GLOBULIN SER CALC-MCNC: 2.7 G/DL (ref 2.8–4.5)
GLUCOSE SERPL-MCNC: 90 MG/DL (ref 65–100)
HCT VFR BLD AUTO: 41.2 % (ref 35.8–46.3)
HDLC SERPL-MCNC: 89 MG/DL (ref 40–60)
HDLC SERPL: 2.2 {RATIO}
HGB BLD-MCNC: 13.2 G/DL (ref 11.7–15.4)
IMM GRANULOCYTES # BLD AUTO: 0 K/UL (ref 0–0.5)
IMM GRANULOCYTES NFR BLD AUTO: 0 % (ref 0–5)
LDLC SERPL CALC-MCNC: 96.8 MG/DL
LYMPHOCYTES # BLD: 1.2 K/UL (ref 0.5–4.6)
LYMPHOCYTES NFR BLD: 20 % (ref 13–44)
MCH RBC QN AUTO: 31.7 PG (ref 26.1–32.9)
MCHC RBC AUTO-ENTMCNC: 32 G/DL (ref 31.4–35)
MCV RBC AUTO: 98.8 FL (ref 82–102)
MONOCYTES # BLD: 0.5 K/UL (ref 0.1–1.3)
MONOCYTES NFR BLD: 9 % (ref 4–12)
NEUTS SEG # BLD: 4 K/UL (ref 1.7–8.2)
NEUTS SEG NFR BLD: 67 % (ref 43–78)
NRBC # BLD: 0 K/UL (ref 0–0.2)
PLATELET # BLD AUTO: 231 K/UL (ref 150–450)
PMV BLD AUTO: 10.6 FL (ref 9.4–12.3)
POTASSIUM SERPL-SCNC: 4 MMOL/L (ref 3.5–5.1)
PROT SERPL-MCNC: 6.4 G/DL (ref 6.3–8.2)
RBC # BLD AUTO: 4.17 M/UL (ref 4.05–5.2)
SODIUM SERPL-SCNC: 142 MMOL/L (ref 133–143)
TRIGL SERPL-MCNC: 66 MG/DL (ref 35–150)
TSH, 3RD GENERATION: 1.6 UIU/ML (ref 0.36–3.74)
VLDLC SERPL CALC-MCNC: 13.2 MG/DL (ref 6–23)
WBC # BLD AUTO: 5.8 K/UL (ref 4.3–11.1)

## 2022-11-22 DIAGNOSIS — R17 ELEVATED BILIRUBIN: Primary | ICD-10-CM

## 2022-11-22 NOTE — RESULT ENCOUNTER NOTE
Labs show borderline elevated bilirubin - I have ordered a repeat Liver function test - please have patient do the labs in the next 1-2 months.

## 2023-02-21 DIAGNOSIS — R17 ELEVATED BILIRUBIN: ICD-10-CM

## 2023-02-22 LAB
ALBUMIN SERPL-MCNC: 3.8 G/DL (ref 3.2–4.6)
ALBUMIN/GLOB SERPL: 1.4 (ref 0.4–1.6)
ALP SERPL-CCNC: 44 U/L (ref 50–136)
ALT SERPL-CCNC: 14 U/L (ref 12–65)
AST SERPL-CCNC: 16 U/L (ref 15–37)
BILIRUB DIRECT SERPL-MCNC: 0.3 MG/DL
BILIRUB SERPL-MCNC: 1.2 MG/DL (ref 0.2–1.1)
GLOBULIN SER CALC-MCNC: 2.8 G/DL (ref 2.8–4.5)
PROT SERPL-MCNC: 6.6 G/DL (ref 6.3–8.2)

## 2023-02-28 ENCOUNTER — OFFICE VISIT (OUTPATIENT)
Dept: INTERNAL MEDICINE CLINIC | Facility: CLINIC | Age: 73
End: 2023-02-28
Payer: MEDICARE

## 2023-02-28 VITALS
HEIGHT: 63 IN | BODY MASS INDEX: 18.36 KG/M2 | OXYGEN SATURATION: 97 % | HEART RATE: 89 BPM | DIASTOLIC BLOOD PRESSURE: 64 MMHG | SYSTOLIC BLOOD PRESSURE: 120 MMHG | WEIGHT: 103.6 LBS

## 2023-02-28 DIAGNOSIS — Z00.00 MEDICARE ANNUAL WELLNESS VISIT, SUBSEQUENT: Primary | ICD-10-CM

## 2023-02-28 DIAGNOSIS — I10 ESSENTIAL HYPERTENSION: ICD-10-CM

## 2023-02-28 DIAGNOSIS — Z71.89 ADVANCE CARE PLANNING: ICD-10-CM

## 2023-02-28 DIAGNOSIS — K76.89 LIVER CYST: ICD-10-CM

## 2023-02-28 DIAGNOSIS — E78.49 OTHER HYPERLIPIDEMIA: ICD-10-CM

## 2023-02-28 DIAGNOSIS — M85.80 OSTEOPENIA, UNSPECIFIED LOCATION: ICD-10-CM

## 2023-02-28 DIAGNOSIS — R79.89 ELEVATED LFTS: ICD-10-CM

## 2023-02-28 PROCEDURE — 3017F COLORECTAL CA SCREEN DOC REV: CPT | Performed by: INTERNAL MEDICINE

## 2023-02-28 PROCEDURE — 3078F DIAST BP <80 MM HG: CPT | Performed by: INTERNAL MEDICINE

## 2023-02-28 PROCEDURE — 99214 OFFICE O/P EST MOD 30 MIN: CPT | Performed by: INTERNAL MEDICINE

## 2023-02-28 PROCEDURE — 99497 ADVNCD CARE PLAN 30 MIN: CPT | Performed by: INTERNAL MEDICINE

## 2023-02-28 PROCEDURE — G8399 PT W/DXA RESULTS DOCUMENT: HCPCS | Performed by: INTERNAL MEDICINE

## 2023-02-28 PROCEDURE — 1123F ACP DISCUSS/DSCN MKR DOCD: CPT | Performed by: INTERNAL MEDICINE

## 2023-02-28 PROCEDURE — 3074F SYST BP LT 130 MM HG: CPT | Performed by: INTERNAL MEDICINE

## 2023-02-28 PROCEDURE — 1036F TOBACCO NON-USER: CPT | Performed by: INTERNAL MEDICINE

## 2023-02-28 PROCEDURE — 1090F PRES/ABSN URINE INCON ASSESS: CPT | Performed by: INTERNAL MEDICINE

## 2023-02-28 PROCEDURE — G8419 CALC BMI OUT NRM PARAM NOF/U: HCPCS | Performed by: INTERNAL MEDICINE

## 2023-02-28 PROCEDURE — G8484 FLU IMMUNIZE NO ADMIN: HCPCS | Performed by: INTERNAL MEDICINE

## 2023-02-28 PROCEDURE — G0439 PPPS, SUBSEQ VISIT: HCPCS | Performed by: INTERNAL MEDICINE

## 2023-02-28 PROCEDURE — G8427 DOCREV CUR MEDS BY ELIG CLIN: HCPCS | Performed by: INTERNAL MEDICINE

## 2023-02-28 SDOH — ECONOMIC STABILITY: FOOD INSECURITY: WITHIN THE PAST 12 MONTHS, THE FOOD YOU BOUGHT JUST DIDN'T LAST AND YOU DIDN'T HAVE MONEY TO GET MORE.: NEVER TRUE

## 2023-02-28 SDOH — ECONOMIC STABILITY: HOUSING INSECURITY
IN THE LAST 12 MONTHS, WAS THERE A TIME WHEN YOU DID NOT HAVE A STEADY PLACE TO SLEEP OR SLEPT IN A SHELTER (INCLUDING NOW)?: NO

## 2023-02-28 SDOH — ECONOMIC STABILITY: INCOME INSECURITY: HOW HARD IS IT FOR YOU TO PAY FOR THE VERY BASICS LIKE FOOD, HOUSING, MEDICAL CARE, AND HEATING?: NOT VERY HARD

## 2023-02-28 SDOH — ECONOMIC STABILITY: FOOD INSECURITY: WITHIN THE PAST 12 MONTHS, YOU WORRIED THAT YOUR FOOD WOULD RUN OUT BEFORE YOU GOT MONEY TO BUY MORE.: NEVER TRUE

## 2023-02-28 ASSESSMENT — PATIENT HEALTH QUESTIONNAIRE - PHQ9
SUM OF ALL RESPONSES TO PHQ9 QUESTIONS 1 & 2: 1
SUM OF ALL RESPONSES TO PHQ QUESTIONS 1-9: 1
3. TROUBLE FALLING OR STAYING ASLEEP: 0
2. FEELING DOWN, DEPRESSED OR HOPELESS: 1
1. LITTLE INTEREST OR PLEASURE IN DOING THINGS: 0
SUM OF ALL RESPONSES TO PHQ QUESTIONS 1-9: 1
10. IF YOU CHECKED OFF ANY PROBLEMS, HOW DIFFICULT HAVE THESE PROBLEMS MADE IT FOR YOU TO DO YOUR WORK, TAKE CARE OF THINGS AT HOME, OR GET ALONG WITH OTHER PEOPLE: 0
4. FEELING TIRED OR HAVING LITTLE ENERGY: 0
7. TROUBLE CONCENTRATING ON THINGS, SUCH AS READING THE NEWSPAPER OR WATCHING TELEVISION: 0
5. POOR APPETITE OR OVEREATING: 0
6. FEELING BAD ABOUT YOURSELF - OR THAT YOU ARE A FAILURE OR HAVE LET YOURSELF OR YOUR FAMILY DOWN: 0
9. THOUGHTS THAT YOU WOULD BE BETTER OFF DEAD, OR OF HURTING YOURSELF: 0
SUM OF ALL RESPONSES TO PHQ QUESTIONS 1-9: 1
8. MOVING OR SPEAKING SO SLOWLY THAT OTHER PEOPLE COULD HAVE NOTICED. OR THE OPPOSITE, BEING SO FIGETY OR RESTLESS THAT YOU HAVE BEEN MOVING AROUND A LOT MORE THAN USUAL: 0
SUM OF ALL RESPONSES TO PHQ QUESTIONS 1-9: 1

## 2023-02-28 ASSESSMENT — ENCOUNTER SYMPTOMS
ABDOMINAL PAIN: 0
SHORTNESS OF BREATH: 0
COUGH: 0

## 2023-02-28 ASSESSMENT — LIFESTYLE VARIABLES
HOW MANY STANDARD DRINKS CONTAINING ALCOHOL DO YOU HAVE ON A TYPICAL DAY: PATIENT DOES NOT DRINK
HOW OFTEN DO YOU HAVE A DRINK CONTAINING ALCOHOL: NEVER

## 2023-02-28 NOTE — PROGRESS NOTES
SUBJECTIVE:   Greg Go is a 74 y.o. female seen for a visit regarding   Chief Complaint   Patient presents with    Medicare AWV        HPI  Grew up in [place], has a daughter and son     Anxiety with depression - on Paxil 30mg  Saw a Dermatologist  Hyperlipidemia  Osteoporosis - on Reclast, saw Rheumatology  Eustachian tube dysfunction, hearing loss, Otosclerosis - Sees ENT Dr. Jami Clancy ENT, planning to see audiologist  H/o gastroparesis - Zofran as needed, had ticks on reglan, Still has not had colonoscopy yet - plans to call GI for a 2 day prep - Dr. Shakila Bethea  Patient still wants to wait on Shingles vaccine at this time    Past Medical History, Past Surgical History, Family history, Social History, and Medications were all reviewed with the patient today and updated as necessary. Current Outpatient Medications   Medication Sig Dispense Refill    triamcinolone acetonide (KENALOG) 10 MG/ML injection Inject 10 mg into the articular space once      PARoxetine (PAXIL) 30 MG tablet Take 1 tablet by mouth daily 90 tablet 3    calcium carbonate-vitamin D (CALTRATE) 600-400 MG-UNIT TABS per tab Take 1 tablet by mouth 2 times daily      loratadine (CLARITIN) 10 MG tablet Take 10 mg by mouth daily      tretinoin (RETIN-A) 0.025 % cream APPLY PEA SIZE AMOUNT TO THE FACE NIGHTLY, START EVERY THIRD NIGHT AND INCREASE AS TOLERATED      zoledronic acid (RECLAST) 5 MG/100ML SOLN Infuse 5 mg intravenously once       No current facility-administered medications for this visit.      Allergies   Allergen Reactions    Metoclopramide Other (See Comments)     CNS reaction(s)  Agitation      Penicillins Rash    Clarithromycin Other (See Comments)     Due to domperidone    Erythromycin Other (See Comments)     Due to domperidone    Adhesive Tape Rash     Patient Active Problem List   Diagnosis    Gastroparesis    Eustachian tube disorder    Osteopenia    Tinnitus    Anxiety with depression    Age-related osteoporosis without current pathological fracture     Past Medical History:   Diagnosis Date    Depression     Gastroparesis     Dr. Rowland Gone    Hearing loss     Dr Lopez Early    Osteopenia 8/30/2012    Osteoporosis     Otosclerosis     Rheumatic fever     Tinnitus 8/30/2012    Unspecified adverse effect of anesthesia     s/p EGD, c/o of pain in L eye - had to see MD, was told it was due to eyes not being closed during procedure     Past Surgical History:   Procedure Laterality Date    CHOLECYSTECTOMY      TONSILLECTOMY AND ADENOIDECTOMY      TUBAL LIGATION       Family History   Problem Relation Age of Onset    Hypertension Mother     Heart Disease Mother         \"clots in heart\"    Macular Degen Mother     Osteoporosis Mother     Glaucoma Father     Prostate Cancer Father     Heart Disease Father         arrythmia    Diabetes Maternal Aunt     Stroke Maternal Aunt     Diabetes Maternal Aunt     Diabetes Maternal Aunt     No Known Problems Maternal Grandmother     No Known Problems Maternal Grandfather     Osteoporosis Paternal Grandmother     No Known Problems Paternal Grandfather     Other Neg Hx     Post-op Cognitive Dysfunction Neg Hx     Emergence Delirium Neg Hx     Delayed Awakening Neg Hx     Post-op Nausea/Vomiting Neg Hx     Pseudochol. Deficiency Neg Hx     Malig Hypertherm Neg Hx     Colon Cancer Neg Hx      Social History     Tobacco Use    Smoking status: Never    Smokeless tobacco: Never   Substance Use Topics    Alcohol use: No         Review of Systems   Constitutional:  Negative for fever. Respiratory:  Negative for cough and shortness of breath. Cardiovascular:  Negative for chest pain and leg swelling. Gastrointestinal:  Negative for abdominal pain. Psychiatric/Behavioral:  Negative for behavioral problems and confusion.         OBJECTIVE:  /64 (Site: Left Upper Arm, Position: Sitting, Cuff Size: Small Adult)   Pulse 89   Ht 5' 3\" (1.6 m)   Wt 103 lb 9.6 oz (47 kg)   SpO2 97%   BMI 18.35 kg/m²      Physical Exam  Vitals and nursing note reviewed. Constitutional:       General: She is not in acute distress. Cardiovascular:      Rate and Rhythm: Normal rate and regular rhythm. Pulmonary:      Effort: Pulmonary effort is normal.      Breath sounds: No wheezing. Neurological:      General: No focal deficit present. Mental Status: She is oriented to person, place, and time. Psychiatric:         Mood and Affect: Mood normal.         Behavior: Behavior normal.       Medical problems and test results were reviewed with the patient today. Recent Results (from the past 672 hour(s))   Hepatic Function Panel    Collection Time: 02/21/23 11:08 AM   Result Value Ref Range    Total Protein 6.6 6.3 - 8.2 g/dL    Albumin 3.8 3.2 - 4.6 g/dL    Globulin 2.8 2.8 - 4.5 g/dL    Albumin/Globulin Ratio 1.4 0.4 - 1.6      Total Bilirubin 1.2 (H) 0.2 - 1.1 MG/DL    Bilirubin, Direct 0.3 <0.4 MG/DL    Alk Phosphatase 44 (L) 50 - 136 U/L    AST 16 15 - 37 U/L    ALT 14 12 - 65 U/L         ASSESSMENT and PLAN    Antonia Hopkins was seen today for medicare awv. Diagnoses and all orders for this visit:    Medicare annual wellness visit, subsequent    Elevated LFTs  -     US ABDOMEN COMPLETE; Future  -     CBC with Auto Differential; Future  -     Comprehensive Metabolic Panel; Future    Liver cyst  -     US ABDOMEN COMPLETE; Future    Advance care planning    Essential hypertension    Osteopenia, unspecified location  -     Vitamin D 25 Hydroxy; Future    Other hyperlipidemia  -     Lipid Panel; Future  -     TSH; Future    No follow-ups on file. Medicare Annual Wellness Visit    Jayashree Camarena is here for Medicare AWV    Assessment & Plan   Medicare annual wellness visit, subsequent  Elevated LFTs  -     US ABDOMEN COMPLETE; Future  -     CBC with Auto Differential; Future  -     Comprehensive Metabolic Panel;  Future  Liver cyst  -     US ABDOMEN COMPLETE; Future  Advance care planning  Essential hypertension  Osteopenia, unspecified location  -     Vitamin D 25 Hydroxy; Future  Other hyperlipidemia  -     Lipid Panel; Future  -     TSH; Future      Recommendations for Preventive Services Due: see orders and patient instructions/AVS.  Recommended screening schedule for the next 5-10 years is provided to the patient in written form: see Patient Instructions/AVS.     No follow-ups on file. Subjective     Patient's complete Health Risk Assessment and screening values have been reviewed and are found in Flowsheets. The following problems were reviewed today and where indicated follow up appointments were made and/or referrals ordered. Positive Risk Factor Screenings with Interventions:                 Weight and Activity:  Physical Activity: Sufficiently Active    Days of Exercise per Week: 5 days    Minutes of Exercise per Session: 30 min     On average, how many days per week do you engage in moderate to strenuous exercise (like a brisk walk)?: 5 days  Have you lost any weight without trying in the past 3 months?: No  Body mass index: (!) 18.35    Underweight Interventions:  Patient advised to follow-up in this office for further evaluation and treatment      Dentist Screen:  Have you seen the dentist within the past year?: (!) No    Intervention:  Advised to schedule with their dentist      Safety:  Do you have any tripping hazards - loose or unsecured carpets or rugs?: (!) Yes  Do you have either shower bars, grab bars, non-slip mats or non-slip surfaces in your shower or bathtub?: (!) No  Interventions:  Advised to get shower bars and grab bars                 Objective   Vitals:    02/28/23 0845   BP: 120/64   Site: Left Upper Arm   Position: Sitting   Cuff Size: Small Adult   Pulse: 89   SpO2: 97%   Weight: 103 lb 9.6 oz (47 kg)   Height: 5' 3\" (1.6 m)      Body mass index is 18.35 kg/m².             Allergies   Allergen Reactions    Metoclopramide Other (See Comments)     CNS reaction(s)  Agitation      Penicillins Rash    Clarithromycin Other (See Comments)     Due to domperidone    Erythromycin Other (See Comments)     Due to domperidone    Adhesive Tape Rash     Prior to Visit Medications    Medication Sig Taking?  Authorizing Provider   triamcinolone acetonide (KENALOG) 10 MG/ML injection Inject 10 mg into the articular space once Yes Historical Provider, MD   PARoxetine (PAXIL) 30 MG tablet Take 1 tablet by mouth daily Yes Neida Christian MD   calcium carbonate-vitamin D (CALTRATE) 600-400 MG-UNIT TABS per tab Take 1 tablet by mouth 2 times daily Yes Ar Automatic Reconciliation   loratadine (CLARITIN) 10 MG tablet Take 10 mg by mouth daily Yes Ar Automatic Reconciliation   tretinoin (RETIN-A) 0.025 % cream APPLY PEA SIZE AMOUNT TO THE FACE NIGHTLY, START EVERY THIRD NIGHT AND INCREASE AS TOLERATED Yes Ar Automatic Reconciliation   zoledronic acid (RECLAST) 5 MG/100ML SOLN Infuse 5 mg intravenously once Yes Ar Automatic Reconciliation       CareTeam (Including outside providers/suppliers regularly involved in providing care):   Patient Care Team:  Neida Christian MD as PCP - Kendrick Murphy MD as PCP - Empaneled Provider     Reviewed and updated this visit:  Tobacco  Allergies  Meds  Problems  Med Hx  Surg Hx  Soc Hx  Fam Hx               Neida Christian MD

## 2023-02-28 NOTE — PATIENT INSTRUCTIONS
Learning About Dental Care for Older Adults  Dental care for older adults: Overview  Dental care for older people is much the same as for younger adults. But older adults do have concerns that younger adults do not. Older adults may have problems with gum disease and decay on the roots of their teeth. They may need missing teeth replaced or broken fillings fixed. Or they may have dentures that need to be cared for. Some older adults may have trouble holding a toothbrush. You can help remind the person you are caring for to brush and floss their teeth or to clean their dentures. In some cases, you may need to do the brushing and other dental care tasks. People who have trouble using their hands or who have dementia may need this extra help. How can you help with dental care? Normal dental care  To keep the teeth and gums healthy:  Brush the teeth with fluoride toothpaste twice a day--in the morning and at night--and floss at least once a day. Plaque can quickly build up on the teeth of older adults. Watch for the signs of gum disease. These signs include gums that bleed after brushing or after eating hard foods, such as apples. See a dentist regularly. Many experts recommend checkups every 6 months. Keep the dentist up to date on any new medications the person is taking. Encourage a balanced diet that includes whole grains, vegetables, and fruits, and that is low in saturated fat and sodium. Encourage the person you're caring for not to use tobacco products. They can affect dental and general health. Many older adults have a fixed income and feel that they can't afford dental care. But most [facility] and [facility] have programs in which dentists help older adults by lowering fees. Contact your area's public health offices or  for information about dental care in your area.   Using a toothbrush  Older adults with arthritis sometimes have trouble brushing their teeth because they can't easily hold the toothbrush. Their hands and fingers may be stiff, painful, or weak. If this is the case, you can: Offer an electric toothbrush. Enlarge the handle of a non-electric toothbrush by wrapping a sponge, an elastic bandage, or adhesive tape around it. Push the toothbrush handle through a ball made of rubber or soft foam.  Make the handle longer and thicker by taping Popsicle sticks or tongue depressors to it. You may also be able to buy special toothbrushes, toothpaste dispensers, and floss holders. Your doctor may recommend a soft-bristle toothbrush if the person you care for bleeds easily. Bleeding can happen because of a health problem or from certain medicines. A toothpaste for sensitive teeth may help if the person you care for has sensitive teeth. How do you brush and floss someone's teeth? If the person you are caring for has a hard time cleaning their teeth on their own, you may need to brush and floss their teeth for them. It may be easiest to have the person sit and face away from you, and to sit or stand behind them. That way you can steady their head against your arm as you reach around to floss and brush their teeth. Choose a place that has good lighting and is comfortable for both of you. Before you begin, gather your supplies. You will need gloves, floss, a toothbrush, and a container to hold water if you are not near a sink. Wash and dry your hands well and put on gloves. Start by flossing:  Gently work a piece of floss between each of the teeth toward the gums. A plastic flossing tool may make this easier, and they are available at most [facility]. Curve the floss around each tooth into a U-shape and gently slide it under the gum line. Move the floss firmly up and down several times to scrape off the plaque. After you've finished flossing, throw away the used floss and begin brushing:  Wet the brush and apply toothpaste. Place the brush at a 45-degree angle where the teeth meet the gums. Press firmly, and move the brush in small circles over the surface of the teeth. Be careful not to brush too hard. Vigorous brushing can make the gums pull away from the teeth and can scratch the tooth enamel. Brush all surfaces of the teeth, on the tongue side and on the cheek side. Pay special attention to the front teeth and all surfaces of the back teeth. Brush chewing surfaces with short back-and-forth strokes. After you've finished, help the person rinse the remaining toothpaste from their mouth. Where can you learn more? Go to http://www.woods.com/ and enter F944 to learn more about \"Learning About Dental Care for Older Adults. \"  Current as of: June 16, 2022               Content Version: 13.5  © 2006-2022 Healthwise, [employer]. Care instructions adapted under license by [facility] ([facility]). If you have questions about a medical condition or this instruction, always ask your healthcare professional. [place] [address] any warranty or liability for your use of this information. Advance Directives: Care Instructions  Overview  An advance directive is a legal way to state your wishes at the end of your life. It tells your family and your doctor what to do if you can't say what you want. There are two main types of advance directives. You can change them any time your wishes change. Living will. This form tells your family and your doctor your wishes about life support and other treatment. The form is also called a declaration. Medical power of . This form lets you name a person to make treatment decisions for you when you can't speak for yourself. This person is called a health care agent (health care proxy, health care surrogate). The form is also called a durable power of  for health care.   If you do not have an advance directive, decisions about your medical care may be made by a family member, or by a doctor or a  who doesn't know you.  It may help to think of an advance directive as a gift to the people who care for you. If you have one, they won't have to make tough decisions by themselves. For more information, including forms for your state, see the [address] website (www.caringinfo.org/planning/advance-directives/). Follow-up care is a key part of your treatment and safety. Be sure to make and go to all appointments, and call your doctor if you are having problems. It's also a good idea to know your test results and keep a list of the medicines you take. What should you include in an advance directive? Many states have a unique advance directive form. (It may ask you to address specific issues.) Or you might use a universal form that's approved by many states. If your form doesn't tell you what to address, it may be hard to know what to include in your advance directive. Use the questions below to help you get started. Who do you want to make decisions about your medical care if you are not able to? What life-support measures do you want if you have a serious illness that gets worse over time or can't be cured? What are you most afraid of that might happen? (Maybe you're afraid of having pain, losing your independence, or being kept alive by machines.)  Where would you prefer to die? (Your home? A hospital? A nursing home?)  Do you want to donate your organs when you die? Do you want certain [employer]ism practices performed before you die? When should you call for help? Be sure to contact your doctor if you have any questions. Where can you learn more? Go to http://www.kaba.com/ and enter R264 to learn more about \"Advance Directives: Care Instructions. \"  Current as of: June 16, 2022               Content Version: 13.5  © 6682-3421 Healthwise, Inc[employer]. Care instructions adapted under license by [facility] ([facility]).  If you have questions about a medical condition or this instruction, always ask your healthcare professional. [address] any warranty or liability for your use of this information. A Healthy Heart: Care Instructions  Your Care Instructions     Coronary artery disease, also called heart disease, occurs when a substance called plaque builds up in the vessels that supply oxygen-rich blood to your heart muscle. This can narrow the blood vessels and reduce blood flow. A heart attack happens when blood flow is completely blocked. A high-fat diet, smoking, and other factors increase the risk of heart disease. Your doctor has found that you have a chance of having heart disease. You can do lots of things to keep your heart healthy. It may not be easy, but you can change your diet, exercise more, and quit smoking. These steps really work to lower your chance of heart disease. Follow-up care is a key part of your treatment and safety. Be sure to make and go to all appointments, and call your doctor if you are having problems. It's also a good idea to know your test results and keep a list of the medicines you take. How can you care for yourself at home? Diet    Use less salt when you cook and eat. This helps lower your blood pressure. Taste food before salting. Add only a little salt when you think you need it. With time, your taste buds will adjust to less salt.     Eat fewer snack items, fast foods, canned soups, and other high-salt, high-fat, processed foods.     Read food labels and try to avoid saturated and trans fats. They increase your risk of heart disease by raising cholesterol levels.     Limit the amount of solid fat-butter, margarine, and shortening-you eat. Use olive, peanut, or canola oil when you cook. Bake, broil, and steam foods instead of frying them.     Eat a variety of fruit and vegetables every day. Dark green, deep orange, red, or yellow fruits and vegetables are especially good for you.  Examples include spinach, carrots, peaches, and berries.     Foods high in fiber can reduce your cholesterol and provide important vitamins and minerals. High-fiber foods include whole-grain cereals and breads, oatmeal, beans, brown rice, citrus fruits, and apples.     Eat lean proteins. Heart-healthy proteins include seafood, lean meats and poultry, eggs, beans, peas, nuts, seeds, and soy products.     Limit drinks and foods with added sugar. These include candy, desserts, and soda pop. Lifestyle changes    If your doctor recommends it, get more exercise. Walking is a good choice. Bit by bit, increase the amount you walk every day. Try for at least 30 minutes on most days of the week. You also may want to swim, bike, or do other activities.     Do not smoke. If you need help quitting, talk to your doctor about stop-smoking programs and medicines. These can increase your chances of quitting for good. Quitting smoking may be the most important step you can take to protect your heart. It is never too late to quit.     Limit alcohol to 2 drinks a day for men and 1 drink a day for women. Too much alcohol can cause health problems.     Manage other health problems such as diabetes, high blood pressure, and high cholesterol. If you think you may have a problem with alcohol or drug use, talk to your doctor. Medicines    Take your medicines exactly as prescribed. Call your doctor if you think you are having a problem with your medicine.     If your doctor recommends aspirin, take the amount directed each day. Make sure you take aspirin and not another kind of pain reliever, such as acetaminophen (Tylenol). When should you call for help? Call 911 if you have symptoms of a heart attack.  These may include:    Chest pain or pressure, or a strange feeling in the chest.     Sweating.     Shortness of breath.     Pain, pressure, or a strange feeling in the back, neck, jaw, or upper belly or in one or both shoulders or arms.     Lightheadedness or sudden weakness.     A fast or irregular heartbeat. After you call 911, the  may tell you to chew 1 adult-strength or 2 to 4 low-dose aspirin. Wait for an ambulance. Do not try to drive yourself. Watch closely for changes in your health, and be sure to contact your doctor if you have any problems. Where can you learn more? Go to http://www.kaba.com/ and enter F075 to learn more about \"A Healthy Heart: Care Instructions. \"  Current as of: September 7, 2022               Content Version: 13.5  © 0042-8547 Healthwise, [employer]. Care instructions adapted under license by [facility] ([facility]). If you have questions about a medical condition or this instruction, always ask your healthcare professional. [address] any warranty or liability for your use of this information. Personalized Preventive Plan for Cynthia Pizarro - 2/28/2023  Medicare offers a range of preventive health benefits. Some of the tests and screenings are paid in full while other may be subject to a deductible, co-insurance, and/or copay. Some of these benefits include a comprehensive review of your medical history including lifestyle, illnesses that may run in your family, and various assessments and screenings as appropriate. After reviewing your medical record and screening and assessments performed today your provider may have ordered immunizations, labs, imaging, and/or referrals for you. A list of these orders (if applicable) as well as your Preventive Care list are included within your After Visit Summary for your review. Other Preventive Recommendations:    A preventive eye exam performed by an eye specialist is recommended every 1-2 years to screen for glaucoma; cataracts, macular degeneration, and other eye disorders. A preventive dental visit is recommended every 6 months. Try to get at least 150 minutes of exercise per week or 10,000 steps per day on a pedometer .   Order or download the FREE \"Exercise & Physical Activity: Your Everyday Guide\" from [employer]. Call 7-101.698.4956 or search [employer] on Aging online. You need 0109-0745 mg of calcium and 6748-1530 IU of vitamin D per day. It is possible to meet your calcium requirement with diet alone, but a vitamin D supplement is usually necessary to meet this goal.  When exposed to the sun, use a sunscreen that protects against both UVA and UVB radiation with an SPF of 30 or greater. Reapply every 2 to 3 hours or after sweating, drying off with a towel, or swimming. Always wear a seat belt when traveling in a car. Always wear a helmet when riding a bicycle or motorcycle.

## 2023-03-06 ENCOUNTER — HOSPITAL ENCOUNTER (OUTPATIENT)
Dept: ULTRASOUND IMAGING | Age: 73
Discharge: HOME | End: 2023-03-09

## 2023-03-06 DIAGNOSIS — R79.89 ELEVATED LFTS: ICD-10-CM

## 2023-03-06 DIAGNOSIS — K76.89 LIVER CYST: ICD-10-CM

## 2023-03-07 NOTE — RESULT ENCOUNTER NOTE
Ultrasound renal has a possible abnormality - please schedule a visit with patient to discuss within next 1-2 months.

## 2023-03-08 ENCOUNTER — TELEPHONE (OUTPATIENT)
Dept: INTERNAL MEDICINE CLINIC | Facility: CLINIC | Age: 73
End: 2023-03-08

## 2023-03-08 NOTE — TELEPHONE ENCOUNTER
----- Message from Avi Pedersen MD sent at 3/7/2023  2:12 PM EST -----  Ultrasound renal has a possible abnormality - please schedule a visit with patient to discuss within next 1-2 months.

## 2023-04-03 ENCOUNTER — OFFICE VISIT (OUTPATIENT)
Dept: INTERNAL MEDICINE CLINIC | Facility: CLINIC | Age: 73
End: 2023-04-03
Payer: MEDICARE

## 2023-04-03 VITALS
OXYGEN SATURATION: 97 % | WEIGHT: 105.4 LBS | SYSTOLIC BLOOD PRESSURE: 104 MMHG | BODY MASS INDEX: 18.67 KG/M2 | HEART RATE: 67 BPM | DIASTOLIC BLOOD PRESSURE: 60 MMHG

## 2023-04-03 DIAGNOSIS — D17.71 ANGIOMYOLIPOMA OF KIDNEY: Primary | ICD-10-CM

## 2023-04-03 PROCEDURE — 1090F PRES/ABSN URINE INCON ASSESS: CPT | Performed by: INTERNAL MEDICINE

## 2023-04-03 PROCEDURE — 1123F ACP DISCUSS/DSCN MKR DOCD: CPT | Performed by: INTERNAL MEDICINE

## 2023-04-03 PROCEDURE — 3017F COLORECTAL CA SCREEN DOC REV: CPT | Performed by: INTERNAL MEDICINE

## 2023-04-03 PROCEDURE — G8420 CALC BMI NORM PARAMETERS: HCPCS | Performed by: INTERNAL MEDICINE

## 2023-04-03 PROCEDURE — G8427 DOCREV CUR MEDS BY ELIG CLIN: HCPCS | Performed by: INTERNAL MEDICINE

## 2023-04-03 PROCEDURE — 99213 OFFICE O/P EST LOW 20 MIN: CPT | Performed by: INTERNAL MEDICINE

## 2023-04-03 PROCEDURE — 1036F TOBACCO NON-USER: CPT | Performed by: INTERNAL MEDICINE

## 2023-04-03 PROCEDURE — G8399 PT W/DXA RESULTS DOCUMENT: HCPCS | Performed by: INTERNAL MEDICINE

## 2023-04-03 ASSESSMENT — ENCOUNTER SYMPTOMS
SHORTNESS OF BREATH: 0
ABDOMINAL PAIN: 0
COUGH: 0

## 2023-04-03 ASSESSMENT — PATIENT HEALTH QUESTIONNAIRE - PHQ9
3. TROUBLE FALLING OR STAYING ASLEEP: 0
SUM OF ALL RESPONSES TO PHQ9 QUESTIONS 1 & 2: 0
4. FEELING TIRED OR HAVING LITTLE ENERGY: 0
6. FEELING BAD ABOUT YOURSELF - OR THAT YOU ARE A FAILURE OR HAVE LET YOURSELF OR YOUR FAMILY DOWN: 0
9. THOUGHTS THAT YOU WOULD BE BETTER OFF DEAD, OR OF HURTING YOURSELF: 0
SUM OF ALL RESPONSES TO PHQ QUESTIONS 1-9: 0
2. FEELING DOWN, DEPRESSED OR HOPELESS: 0
10. IF YOU CHECKED OFF ANY PROBLEMS, HOW DIFFICULT HAVE THESE PROBLEMS MADE IT FOR YOU TO DO YOUR WORK, TAKE CARE OF THINGS AT HOME, OR GET ALONG WITH OTHER PEOPLE: 0
SUM OF ALL RESPONSES TO PHQ QUESTIONS 1-9: 0
8. MOVING OR SPEAKING SO SLOWLY THAT OTHER PEOPLE COULD HAVE NOTICED. OR THE OPPOSITE, BEING SO FIGETY OR RESTLESS THAT YOU HAVE BEEN MOVING AROUND A LOT MORE THAN USUAL: 0
7. TROUBLE CONCENTRATING ON THINGS, SUCH AS READING THE NEWSPAPER OR WATCHING TELEVISION: 0
SUM OF ALL RESPONSES TO PHQ QUESTIONS 1-9: 0
5. POOR APPETITE OR OVEREATING: 0
SUM OF ALL RESPONSES TO PHQ QUESTIONS 1-9: 0
1. LITTLE INTEREST OR PLEASURE IN DOING THINGS: 0

## 2023-04-03 NOTE — PROGRESS NOTES
SUBJECTIVE:   Ramya Arizmendi is a 74 y.o. female seen for a visit regarding   Chief Complaint   Patient presents with    Other     Ultrasound renal results        HPI  Grew up in [place], has a daughter and son      Anxiety with depression - on Paxil 30mg  Saw a Dermatologist  Osteoporosis - on Reclast, saw Rheumatology  Eustachian tube dysfunction, hearing loss, Otosclerosis - Sees ENT Dr. Herberth Santo ENT, getting hearing aids  H/o gastroparesis - Zofran as needed, had ticks on reglan, Still has not had colonoscopy yet - plans to call GI for a 2 day prep - Dr. Garcia Sick  Patient still wants to wait on Shingles vaccine at this time  Fatty liver mild on Ultrasound? Ultrasound renal has a possible abnormality - possible angiomyolipoma, no abdominal pain    Past Medical History, Past Surgical History, Family history, Social History, and Medications were all reviewed with the patient today and updated as necessary. Current Outpatient Medications   Medication Sig Dispense Refill    triamcinolone acetonide (KENALOG) 10 MG/ML injection Inject 1 mL into the articular space once      PARoxetine (PAXIL) 30 MG tablet Take 1 tablet by mouth daily 90 tablet 3    calcium carbonate-vitamin D (CALTRATE) 600-400 MG-UNIT TABS per tab Take 1 tablet by mouth 2 times daily      loratadine (CLARITIN) 10 MG tablet Take 1 tablet by mouth daily      tretinoin (RETIN-A) 0.025 % cream APPLY PEA SIZE AMOUNT TO THE FACE NIGHTLY, START EVERY THIRD NIGHT AND INCREASE AS TOLERATED      zoledronic acid (RECLAST) 5 MG/100ML SOLN Infuse 100 mLs intravenously once       No current facility-administered medications for this visit.      Allergies   Allergen Reactions    Metoclopramide Other (See Comments)     CNS reaction(s)  Agitation      Penicillins Rash    Clarithromycin Other (See Comments)     Due to domperidone    Erythromycin Other (See Comments)     Due to domperidone    Adhesive Tape Rash     Patient Active Problem List

## 2023-04-17 DIAGNOSIS — Z79.83 LONG TERM (CURRENT) USE OF BISPHOSPHONATES: ICD-10-CM

## 2023-04-17 DIAGNOSIS — M81.0 AGE-RELATED OSTEOPOROSIS WITHOUT CURRENT PATHOLOGICAL FRACTURE: Primary | ICD-10-CM

## 2023-04-18 ENCOUNTER — OFFICE VISIT (OUTPATIENT)
Dept: INTERNAL MEDICINE CLINIC | Facility: CLINIC | Age: 73
End: 2023-04-18
Payer: MEDICARE

## 2023-04-18 ENCOUNTER — HOSPITAL ENCOUNTER (OUTPATIENT)
Dept: GENERAL RADIOLOGY | Age: 73
Discharge: HOME | End: 2023-04-21

## 2023-04-18 VITALS
WEIGHT: 105.2 LBS | HEIGHT: 63 IN | BODY MASS INDEX: 18.64 KG/M2 | SYSTOLIC BLOOD PRESSURE: 110 MMHG | DIASTOLIC BLOOD PRESSURE: 60 MMHG

## 2023-04-18 DIAGNOSIS — R05.1 ACUTE COUGH: Primary | ICD-10-CM

## 2023-04-18 DIAGNOSIS — R09.1 PLEURISY: ICD-10-CM

## 2023-04-18 DIAGNOSIS — R05.1 ACUTE COUGH: ICD-10-CM

## 2023-04-18 PROCEDURE — 3017F COLORECTAL CA SCREEN DOC REV: CPT | Performed by: INTERNAL MEDICINE

## 2023-04-18 PROCEDURE — G8420 CALC BMI NORM PARAMETERS: HCPCS | Performed by: INTERNAL MEDICINE

## 2023-04-18 PROCEDURE — G8427 DOCREV CUR MEDS BY ELIG CLIN: HCPCS | Performed by: INTERNAL MEDICINE

## 2023-04-18 PROCEDURE — G8399 PT W/DXA RESULTS DOCUMENT: HCPCS | Performed by: INTERNAL MEDICINE

## 2023-04-18 PROCEDURE — 1123F ACP DISCUSS/DSCN MKR DOCD: CPT | Performed by: INTERNAL MEDICINE

## 2023-04-18 PROCEDURE — 99214 OFFICE O/P EST MOD 30 MIN: CPT | Performed by: INTERNAL MEDICINE

## 2023-04-18 PROCEDURE — 1090F PRES/ABSN URINE INCON ASSESS: CPT | Performed by: INTERNAL MEDICINE

## 2023-04-18 PROCEDURE — 1036F TOBACCO NON-USER: CPT | Performed by: INTERNAL MEDICINE

## 2023-04-18 RX ORDER — DOXYCYCLINE 100 MG/1
100 CAPSULE ORAL 2 TIMES DAILY
Qty: 12 CAPSULE | Refills: 0 | COMMUNITY
Start: 2023-04-15 | End: 2023-04-21

## 2023-04-18 ASSESSMENT — PATIENT HEALTH QUESTIONNAIRE - PHQ9
6. FEELING BAD ABOUT YOURSELF - OR THAT YOU ARE A FAILURE OR HAVE LET YOURSELF OR YOUR FAMILY DOWN: 0
9. THOUGHTS THAT YOU WOULD BE BETTER OFF DEAD, OR OF HURTING YOURSELF: 0
5. POOR APPETITE OR OVEREATING: 0
SUM OF ALL RESPONSES TO PHQ QUESTIONS 1-9: 2
2. FEELING DOWN, DEPRESSED OR HOPELESS: 1
1. LITTLE INTEREST OR PLEASURE IN DOING THINGS: 0
SUM OF ALL RESPONSES TO PHQ QUESTIONS 1-9: 2
3. TROUBLE FALLING OR STAYING ASLEEP: 0
8. MOVING OR SPEAKING SO SLOWLY THAT OTHER PEOPLE COULD HAVE NOTICED. OR THE OPPOSITE, BEING SO FIGETY OR RESTLESS THAT YOU HAVE BEEN MOVING AROUND A LOT MORE THAN USUAL: 0
SUM OF ALL RESPONSES TO PHQ QUESTIONS 1-9: 2
10. IF YOU CHECKED OFF ANY PROBLEMS, HOW DIFFICULT HAVE THESE PROBLEMS MADE IT FOR YOU TO DO YOUR WORK, TAKE CARE OF THINGS AT HOME, OR GET ALONG WITH OTHER PEOPLE: 0
4. FEELING TIRED OR HAVING LITTLE ENERGY: 1
SUM OF ALL RESPONSES TO PHQ9 QUESTIONS 1 & 2: 1
7. TROUBLE CONCENTRATING ON THINGS, SUCH AS READING THE NEWSPAPER OR WATCHING TELEVISION: 0
SUM OF ALL RESPONSES TO PHQ QUESTIONS 1-9: 2

## 2023-04-18 ASSESSMENT — ENCOUNTER SYMPTOMS
ABDOMINAL PAIN: 0
SHORTNESS OF BREATH: 0
COUGH: 0

## 2023-04-18 NOTE — PROGRESS NOTES
SUBJECTIVE:   Nacho Mcdonough is a 74 y.o. female seen for a visit regarding   Chief Complaint   Patient presents with    Follow-up        HPI  Grew up in [place], has a daughter and son      Was in urgent care, was given amoxicillin for Pleurisy with symptoms started 5 days ago, had Amoxicillin for 2 days with diarrhea, now taking Doxycycline since 3 days, now feels tired, and week; no shortness of breath but has cough; no fever, no chest pain  Anxiety with depression - on Paxil 30mg  Saw a Dermatologist  Osteoporosis - on Reclast, saw Rheumatology  Eustachian tube dysfunction, hearing loss, Otosclerosis - Sees ENT Dr. Quintana Held ENT, getting hearing aids  H/o gastroparesis - Zofran as needed, had ticks on reglan, Still has not had colonoscopy yet - plans to call GI for a 2 day prep - Dr. Caden Darling  Patient still wants to wait on Shingles vaccine at this time  Fatty liver mild on Ultrasound? Ultrasound renal has a possible abnormality - possible angiomyolipoma, no abdominal pain, CT abdomen pending    Past Medical History, Past Surgical History, Family history, Social History, and Medications were all reviewed with the patient today and updated as necessary.        Current Outpatient Medications   Medication Sig Dispense Refill    doxycycline monohydrate (MONODOX) 100 MG capsule Take 1 capsule by mouth in the morning and at bedtime 12 capsule 0    NONFORMULARY Take 10 mg by mouth in the morning and at bedtime DomperidoneDomperidone      PARoxetine (PAXIL) 30 MG tablet Take 1 tablet by mouth daily 90 tablet 3    calcium carbonate-vitamin D (CALTRATE) 600-400 MG-UNIT TABS per tab Take 1 tablet by mouth 2 times daily      loratadine (CLARITIN) 10 MG tablet Take 1 tablet by mouth daily      tretinoin (RETIN-A) 0.025 % cream APPLY PEA SIZE AMOUNT TO THE FACE NIGHTLY, START EVERY THIRD NIGHT AND INCREASE AS TOLERATED      zoledronic acid (RECLAST) 5 MG/100ML SOLN Infuse 100 mLs intravenously once

## 2023-04-26 DIAGNOSIS — M81.0 AGE-RELATED OSTEOPOROSIS WITHOUT CURRENT PATHOLOGICAL FRACTURE: ICD-10-CM

## 2023-04-26 DIAGNOSIS — Z79.83 LONG TERM (CURRENT) USE OF BISPHOSPHONATES: ICD-10-CM

## 2023-04-27 LAB
25(OH)D3 SERPL-MCNC: 33.8 NG/ML (ref 30–100)
ALBUMIN SERPL-MCNC: 3.7 G/DL (ref 3.2–4.6)
ALBUMIN/GLOB SERPL: 1.4 (ref 0.4–1.6)
ALP SERPL-CCNC: 46 U/L (ref 50–136)
ALT SERPL-CCNC: 16 U/L (ref 12–65)
ANION GAP SERPL CALC-SCNC: 2 MMOL/L (ref 2–11)
AST SERPL-CCNC: 20 U/L (ref 15–37)
BILIRUB SERPL-MCNC: 1 MG/DL (ref 0.2–1.1)
BUN SERPL-MCNC: 18 MG/DL (ref 8–23)
CALCIUM SERPL-MCNC: 9.1 MG/DL (ref 8.3–10.4)
CHLORIDE SERPL-SCNC: 106 MMOL/L (ref 101–110)
CO2 SERPL-SCNC: 32 MMOL/L (ref 21–32)
CREAT SERPL-MCNC: 0.7 MG/DL (ref 0.6–1)
GLOBULIN SER CALC-MCNC: 2.7 G/DL (ref 2.8–4.5)
GLUCOSE SERPL-MCNC: 81 MG/DL (ref 65–100)
POTASSIUM SERPL-SCNC: 4.2 MMOL/L (ref 3.5–5.1)
PROT SERPL-MCNC: 6.4 G/DL (ref 6.3–8.2)
SODIUM SERPL-SCNC: 140 MMOL/L (ref 133–143)

## 2023-05-01 ENCOUNTER — TELEPHONE (OUTPATIENT)
Dept: RHEUMATOLOGY | Age: 73
End: 2023-05-01

## 2023-05-01 ENCOUNTER — TRANSCRIBE ORDERS (OUTPATIENT)
Dept: SCHEDULING | Age: 73
End: 2023-05-01

## 2023-05-01 DIAGNOSIS — M81.0 POST-MENOPAUSAL OSTEOPOROSIS: Primary | ICD-10-CM

## 2023-05-01 DIAGNOSIS — Z79.83 PERSONAL HISTORY OF ONGOING TREATMENT WITH ALENDRONATE (FOSAMAX): ICD-10-CM

## 2023-05-01 NOTE — TELEPHONE ENCOUNTER
Noted that patient had not done dexa scan that was due prior to upcoming visit. Scan was ordered on 5/9/22 prior to conversion so patient was never scheduled and she could not see order for scan. Spoke to scheduling; they were able to pull over order and have opening tomorrow at 1150, arrival 1130. Instructions to hold mvi and any calcium supplements. Called patient and discussed OV and dexa. Pt states she kept looking for it to be due but never saw it. Pt is able to go to appt tomorrow. Discussed time, location and pre-arrival instructions, including med hold. Also dwp that our office is now beside [facility]. Pt will be at scheduled appt tomorrow for dexa.

## 2023-05-02 ENCOUNTER — HOSPITAL ENCOUNTER (OUTPATIENT)
Dept: MAMMOGRAPHY | Age: 73
Discharge: HOME | End: 2023-05-05
Payer: MEDICARE

## 2023-05-02 DIAGNOSIS — Z79.83 PERSONAL HISTORY OF ONGOING TREATMENT WITH ALENDRONATE (FOSAMAX): ICD-10-CM

## 2023-05-02 DIAGNOSIS — M81.0 POST-MENOPAUSAL OSTEOPOROSIS: ICD-10-CM

## 2023-05-02 PROCEDURE — 77080 DXA BONE DENSITY AXIAL: CPT

## 2023-05-09 ENCOUNTER — NURSE ONLY (OUTPATIENT)
Dept: RHEUMATOLOGY | Age: 73
End: 2023-05-09
Payer: MEDICARE

## 2023-05-09 ENCOUNTER — OFFICE VISIT (OUTPATIENT)
Dept: RHEUMATOLOGY | Age: 73
End: 2023-05-09
Payer: MEDICARE

## 2023-05-09 VITALS
HEART RATE: 77 BPM | WEIGHT: 104 LBS | BODY MASS INDEX: 18.72 KG/M2 | TEMPERATURE: 98.2 F | DIASTOLIC BLOOD PRESSURE: 48 MMHG | SYSTOLIC BLOOD PRESSURE: 96 MMHG

## 2023-05-09 VITALS
HEIGHT: 63 IN | DIASTOLIC BLOOD PRESSURE: 64 MMHG | HEART RATE: 80 BPM | BODY MASS INDEX: 18.43 KG/M2 | RESPIRATION RATE: 14 BRPM | WEIGHT: 104 LBS | SYSTOLIC BLOOD PRESSURE: 108 MMHG

## 2023-05-09 DIAGNOSIS — M81.0 AGE-RELATED OSTEOPOROSIS WITHOUT CURRENT PATHOLOGICAL FRACTURE: Primary | ICD-10-CM
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
## 2023-05-09 DIAGNOSIS — Z79.83 LONG TERM (CURRENT) USE OF BISPHOSPHONATES: ICD-10-CM

## 2023-05-09 PROCEDURE — G8427 DOCREV CUR MEDS BY ELIG CLIN: HCPCS | Performed by: INTERNAL MEDICINE

## 2023-05-09 PROCEDURE — 1090F PRES/ABSN URINE INCON ASSESS: CPT | Performed by: INTERNAL MEDICINE

## 2023-05-09 PROCEDURE — 96365 THER/PROPH/DIAG IV INF INIT: CPT | Performed by: INTERNAL MEDICINE

## 2023-05-09 PROCEDURE — G8420 CALC BMI NORM PARAMETERS: HCPCS | Performed by: INTERNAL MEDICINE

## 2023-05-09 PROCEDURE — 3017F COLORECTAL CA SCREEN DOC REV: CPT | Performed by: INTERNAL MEDICINE

## 2023-05-09 PROCEDURE — G8399 PT W/DXA RESULTS DOCUMENT: HCPCS | Performed by: INTERNAL MEDICINE

## 2023-05-09 PROCEDURE — 1123F ACP DISCUSS/DSCN MKR DOCD: CPT | Performed by: INTERNAL MEDICINE

## 2023-05-09 PROCEDURE — 1036F TOBACCO NON-USER: CPT | Performed by: INTERNAL MEDICINE

## 2023-05-09 PROCEDURE — 99214 OFFICE O/P EST MOD 30 MIN: CPT | Performed by: INTERNAL MEDICINE

## 2023-05-09 RX ORDER — ZOLEDRONIC ACID 5 MG/100ML
5 INJECTION, SOLUTION INTRAVENOUS ONCE
Status: COMPLETED | OUTPATIENT
Start: 2023-05-09 | End: 2023-05-09

## 2023-05-09 RX ADMIN — ZOLEDRONIC ACID 5 MG: 5 INJECTION, SOLUTION INTRAVENOUS at 10:55

## 2023-05-09 NOTE — PROGRESS NOTES
23      SUBJECTIVE:  Amber Laguna is a 66 y.o. female that is here for follow-up of:     Osteoporosis   reclast - 2021, May 2022   Fosamax: used for a few years ending 6-7 years ago  FHx OP - mom, PGM  Risk: low body weight, low calcium consumption,     - gastroparesis - follows with Dr. Bebeto Gonzalez - ?related to viral infection  - osteoporosis  - depression   ---  Last OV May 2022. Last reclast#2 May 2022. No side effects. First Dx of OP in 2021 based on elevated fracture risk. Completed first Reclast infusion in 2021 without any problems or side effects. Reviewed labs 2023-CMP, vitamin D  Since her last visit, her   in 2021, then her mother in 2022. Previously used fosamax for a few years for osteopenia ending +6-7 years ago. Using Caltrate 1 tab twice daily which includes 600 Mg calcium carbonate, 800 IU vitamin D per tab   No falls or fractures  No planned dental work  Cheese, frozen yogurt - few times / week. Eats out less    Walking 2 miles 3-4 times/week  Daughter is  - she is doing core exercises on regular basis    2 weeks ago had ant bite led to itchy skin rash-this is persistent on left forearm. She developed rash on her right shoulder after the ant bite that has been very itchy. In the last 3 days she has developed new similar lesion on left shoulder. Associated new petechial type rash around L forearm. Using cream OTC which includes hydrocortisone without improvement. The most recent, and if available, past bone density study details are as follows. Date L1-4 spine? BMD L1-4 spine T-score Femoral Neck BMD Femoral Neck T-score Lowest  T-score       1.032  -1.3  left: 0.72  -1.8  9%, 1%      1.077  -1.0  left: 0.766  -2.0  9%, 2%    2021  1.019  -1.4  left: 0.739  R 0.801  -2.2  11%, 2.9%   May 2023 1.020  L 0.755  R 0.791 -2.0 11%, 2.8%           REVIEW OF SYSTEMS:  A total of 10 systems (musculoskeletal

## 2023-05-09 NOTE — PROGRESS NOTES
[address], [address]   Office : [phone], Fax: [phone]       Reclast 5mg/100ml infused today over 30  minutes for safety. Infusion tolerated well without complications. Advised patient to continue taking vit d, calcium, hydrate, tylenol today and at bedtime, and tylenol prn tomorrow if aches occur  post infusion. Call with any problems or side effects. Infusion placed in left Ac vein by Meghan Mckeon RN. IV insertion time: 1055  Medication start time: 1055  Medication completion time: 1125     Patient discharged feeling good  and instructed to call the office with any post-infusion issues. Pre-infusion/injection questionairre for osteoporosis    1. Have you had or are you planning any dental work?  no    2. Are you taking your calcium and vitamin D? yes    3. When was your last osteoporosis treatment? 5/9/2022  4. Have you had any recent fractures?    no

## 2023-07-27 ENCOUNTER — TELEMEDICINE (OUTPATIENT)
Dept: INTERNAL MEDICINE CLINIC | Facility: CLINIC | Age: 73
End: 2023-07-27

## 2023-07-27 DIAGNOSIS — J01.90 ACUTE SINUSITIS, RECURRENCE NOT SPECIFIED, UNSPECIFIED LOCATION: Primary | ICD-10-CM

## 2023-07-27 RX ORDER — LEVOFLOXACIN 500 MG/1
500 TABLET, FILM COATED ORAL DAILY
Qty: 7 TABLET | Refills: 0 | Status: SHIPPED | OUTPATIENT
Start: 2023-07-27 | End: 2023-08-03

## 2023-07-27 ASSESSMENT — PATIENT HEALTH QUESTIONNAIRE - PHQ9
3. TROUBLE FALLING OR STAYING ASLEEP: 0
5. POOR APPETITE OR OVEREATING: 0
SUM OF ALL RESPONSES TO PHQ9 QUESTIONS 1 & 2: 0
9. THOUGHTS THAT YOU WOULD BE BETTER OFF DEAD, OR OF HURTING YOURSELF: 0
SUM OF ALL RESPONSES TO PHQ QUESTIONS 1-9: 0
1. LITTLE INTEREST OR PLEASURE IN DOING THINGS: 0
SUM OF ALL RESPONSES TO PHQ QUESTIONS 1-9: 0
8. MOVING OR SPEAKING SO SLOWLY THAT OTHER PEOPLE COULD HAVE NOTICED. OR THE OPPOSITE, BEING SO FIGETY OR RESTLESS THAT YOU HAVE BEEN MOVING AROUND A LOT MORE THAN USUAL: 0
2. FEELING DOWN, DEPRESSED OR HOPELESS: 0
SUM OF ALL RESPONSES TO PHQ QUESTIONS 1-9: 0
6. FEELING BAD ABOUT YOURSELF - OR THAT YOU ARE A FAILURE OR HAVE LET YOURSELF OR YOUR FAMILY DOWN: 0
4. FEELING TIRED OR HAVING LITTLE ENERGY: 0
7. TROUBLE CONCENTRATING ON THINGS, SUCH AS READING THE NEWSPAPER OR WATCHING TELEVISION: 0
10. IF YOU CHECKED OFF ANY PROBLEMS, HOW DIFFICULT HAVE THESE PROBLEMS MADE IT FOR YOU TO DO YOUR WORK, TAKE CARE OF THINGS AT HOME, OR GET ALONG WITH OTHER PEOPLE: 0
SUM OF ALL RESPONSES TO PHQ QUESTIONS 1-9: 0

## 2023-07-27 ASSESSMENT — ENCOUNTER SYMPTOMS
SHORTNESS OF BREATH: 0
COUGH: 0
ABDOMINAL PAIN: 0

## 2023-07-27 NOTE — PROGRESS NOTES
2023    TELEHEALTH EVALUATION -- Audio/Visual    HPI:    Stevan Gamble (:  1950) has requested an audio/video evaluation for the following concern(s):    Cough, congestion, headache for at least a week, low grade fever - has plans on possible procedure with ENT in future    Review of Systems   Constitutional:  Negative for fever. Respiratory:  Negative for cough and shortness of breath. Cardiovascular:  Negative for chest pain and leg swelling. Gastrointestinal:  Negative for abdominal pain. Psychiatric/Behavioral:  Negative for behavioral problems and confusion. Prior to Visit Medications    Medication Sig Taking?  Authorizing Provider   levoFLOXacin (LEVAQUIN) 500 MG tablet Take 1 tablet by mouth daily for 7 days Yes Vivienne Mckeon MD   NONFORMULARY Take 10 mg by mouth in the morning and at bedtime DomperidoneDomperidone Yes Historical Provider, MD   triamcinolone acetonide (KENALOG) 10 MG/ML injection Inject 1 mL into the articular space once EVERY 6 MO Yes Historical Provider, MD   PARoxetine (PAXIL) 30 MG tablet Take 1 tablet by mouth daily Yes Vivienne Mckeon MD   calcium carbonate-vitamin D (CALTRATE) 600-400 MG-UNIT TABS per tab Take 1 tablet by mouth 2 times daily Yes Ar Automatic Reconciliation   loratadine (CLARITIN) 10 MG tablet Take 1 tablet by mouth daily Yes Ar Automatic Reconciliation   tretinoin (RETIN-A) 0.025 % cream APPLY PEA SIZE AMOUNT TO THE FACE NIGHTLY, START EVERY THIRD NIGHT AND INCREASE AS TOLERATED Yes Ar Automatic Reconciliation   zoledronic acid (RECLAST) 5 MG/100ML SOLN Infuse 100 mLs intravenously once Yes Ar Automatic Reconciliation       Social History     Tobacco Use    Smoking status: Never    Smokeless tobacco: Never   Substance Use Topics    Alcohol use: No    Drug use: No        Allergies   Allergen Reactions    Metoclopramide Other (See Comments)     CNS reaction(s)  Agitation      Penicillins Rash    Clarithromycin Other (See Comments)

## 2023-08-03 ENCOUNTER — HOSPITAL ENCOUNTER (EMERGENCY)
Age: 73
Discharge: HOME | End: 2023-08-03
Attending: EMERGENCY MEDICINE
Payer: MEDICARE

## 2023-08-03 ENCOUNTER — APPOINTMENT (OUTPATIENT)
Dept: GENERAL RADIOLOGY | Age: 73
End: 2023-08-03
Payer: MEDICARE

## 2023-08-03 VITALS
OXYGEN SATURATION: 98 % | TEMPERATURE: 98 F | BODY MASS INDEX: 18.25 KG/M2 | HEART RATE: 72 BPM | WEIGHT: 103 LBS | RESPIRATION RATE: 14 BRPM | HEIGHT: 63 IN | DIASTOLIC BLOOD PRESSURE: 61 MMHG | SYSTOLIC BLOOD PRESSURE: 109 MMHG

## 2023-08-03 DIAGNOSIS — S16.1XXA ACUTE STRAIN OF NECK MUSCLE, INITIAL ENCOUNTER: ICD-10-CM

## 2023-08-03 DIAGNOSIS — J06.9 ACUTE UPPER RESPIRATORY INFECTION: Primary | ICD-10-CM

## 2023-08-03 LAB
ALBUMIN SERPL-MCNC: 3.8 G/DL (ref 3.2–4.6)
ALBUMIN/GLOB SERPL: 1.2 (ref 0.4–1.6)
ALP SERPL-CCNC: 46 U/L (ref 50–136)
ALT SERPL-CCNC: 17 U/L (ref 12–65)
ANION GAP SERPL CALC-SCNC: 2 MMOL/L (ref 2–11)
AST SERPL-CCNC: 26 U/L (ref 15–37)
BASOPHILS # BLD: 0.1 K/UL (ref 0–0.2)
BASOPHILS NFR BLD: 1 % (ref 0–2)
BILIRUB SERPL-MCNC: 1.1 MG/DL (ref 0.2–1.1)
BUN SERPL-MCNC: 14 MG/DL (ref 8–23)
CALCIUM SERPL-MCNC: 9 MG/DL (ref 8.3–10.4)
CHLORIDE SERPL-SCNC: 110 MMOL/L (ref 101–110)
CO2 SERPL-SCNC: 30 MMOL/L (ref 21–32)
CREAT SERPL-MCNC: 0.75 MG/DL (ref 0.6–1)
DIFFERENTIAL METHOD BLD: NORMAL
EOSINOPHIL # BLD: 0.1 K/UL (ref 0–0.8)
EOSINOPHIL NFR BLD: 1 % (ref 0.5–7.8)
ERYTHROCYTE [DISTWIDTH] IN BLOOD BY AUTOMATED COUNT: 12.9 % (ref 11.9–14.6)
GLOBULIN SER CALC-MCNC: 3.1 G/DL (ref 2.8–4.5)
GLUCOSE SERPL-MCNC: 94 MG/DL (ref 65–100)
HCT VFR BLD AUTO: 41.4 % (ref 35.8–46.3)
HGB BLD-MCNC: 13.4 G/DL (ref 11.7–15.4)
IMM GRANULOCYTES # BLD AUTO: 0 K/UL (ref 0–0.5)
IMM GRANULOCYTES NFR BLD AUTO: 0 % (ref 0–5)
LYMPHOCYTES # BLD: 1.1 K/UL (ref 0.5–4.6)
LYMPHOCYTES NFR BLD: 17 % (ref 13–44)
MCH RBC QN AUTO: 31.6 PG (ref 26.1–32.9)
MCHC RBC AUTO-ENTMCNC: 32.4 G/DL (ref 31.4–35)
MCV RBC AUTO: 97.6 FL (ref 82–102)
MONOCYTES # BLD: 0.5 K/UL (ref 0.1–1.3)
MONOCYTES NFR BLD: 8 % (ref 4–12)
NEUTS SEG # BLD: 4.5 K/UL (ref 1.7–8.2)
NEUTS SEG NFR BLD: 72 % (ref 43–78)
NRBC # BLD: 0 K/UL (ref 0–0.2)
PLATELET # BLD AUTO: 212 K/UL (ref 150–450)
PMV BLD AUTO: 10.1 FL (ref 9.4–12.3)
POTASSIUM SERPL-SCNC: 3.8 MMOL/L (ref 3.5–5.1)
PROT SERPL-MCNC: 6.9 G/DL (ref 6.3–8.2)
RBC # BLD AUTO: 4.24 M/UL (ref 4.05–5.2)
SARS-COV-2 RDRP RESP QL NAA+PROBE: NOT DETECTED
SODIUM SERPL-SCNC: 142 MMOL/L (ref 133–143)
SOURCE: NORMAL
WBC # BLD AUTO: 6.3 K/UL (ref 4.3–11.1)

## 2023-08-03 PROCEDURE — 99284 EMERGENCY DEPT VISIT MOD MDM: CPT

## 2023-08-03 PROCEDURE — 6370000000 HC RX 637 (ALT 250 FOR IP): Performed by: PHYSICIAN ASSISTANT

## 2023-08-03 PROCEDURE — 80053 COMPREHEN METABOLIC PANEL: CPT

## 2023-08-03 PROCEDURE — 87635 SARS-COV-2 COVID-19 AMP PRB: CPT

## 2023-08-03 PROCEDURE — 2580000003 HC RX 258: Performed by: PHYSICIAN ASSISTANT

## 2023-08-03 PROCEDURE — 85025 COMPLETE CBC W/AUTO DIFF WBC: CPT

## 2023-08-03 PROCEDURE — 71046 X-RAY EXAM CHEST 2 VIEWS: CPT

## 2023-08-03 RX ORDER — 0.9 % SODIUM CHLORIDE 0.9 %
500 INTRAVENOUS SOLUTION INTRAVENOUS ONCE
Status: COMPLETED | OUTPATIENT
Start: 2023-08-03 | End: 2023-08-03

## 2023-08-03 RX ORDER — CYCLOBENZAPRINE HCL 5 MG
5 TABLET ORAL NIGHTLY PRN
Qty: 7 TABLET | Refills: 0 | Status: SHIPPED | OUTPATIENT
Start: 2023-08-03 | End: 2023-08-10

## 2023-08-03 RX ORDER — IBUPROFEN 600 MG/1
600 TABLET ORAL
Status: COMPLETED | OUTPATIENT
Start: 2023-08-03 | End: 2023-08-03

## 2023-08-03 RX ADMIN — SODIUM CHLORIDE 500 ML: 9 INJECTION, SOLUTION INTRAVENOUS at 13:32

## 2023-08-03 RX ADMIN — IBUPROFEN 600 MG: 600 TABLET, FILM COATED ORAL at 13:33

## 2023-08-03 ASSESSMENT — PAIN SCALES - GENERAL
PAINLEVEL_OUTOF10: 5
PAINLEVEL_OUTOF10: 8
PAINLEVEL_OUTOF10: 8

## 2023-08-03 ASSESSMENT — PAIN - FUNCTIONAL ASSESSMENT
PAIN_FUNCTIONAL_ASSESSMENT: 0-10
PAIN_FUNCTIONAL_ASSESSMENT: 0-10

## 2023-08-03 NOTE — ED TRIAGE NOTES
Patient ambulatory to triage. Patient states for 2 weeks she c\o neck neck pain, congestion, headache, and low grade fever. Patient PCP placed her on an antibiotic and since then she has felt worse.

## 2023-08-03 NOTE — ED PROVIDER NOTES
and other typographical errors may be present. This note has not been completely proofread for errors.       Arleen Morales  08/03/23 1423

## 2023-08-03 NOTE — DISCHARGE INSTRUCTIONS
As discussed your labs and chest x-ray were reassuring today. Your COVID test was negative. You can alternate Tylenol and ibuprofen as needed for any headache or body aches. You can use Flexeril at bedtime for muscle spasm. This medication can be sedating, do not drive or drink alcohol with this medication. Follow-up with your ENT as planned for your chronic nasal congestion. Follow-up with PCP in the next 2 to 3 days for recheck if no improvement. Return to the ER for any new or worsening symptoms.

## 2023-08-04 ENCOUNTER — CARE COORDINATION (OUTPATIENT)
Dept: CARE COORDINATION | Facility: CLINIC | Age: 73
End: 2023-08-04

## 2023-08-04 NOTE — CARE COORDINATION
Outreached to patient for CCM assessment of ED visit on 8/3/2023 for upper respiratory infection  Patient states that the flexeril and ice on her neck is helping with the neck pain  Patient very appreciative of my call   Patient has agreed to f/u call again next week   Ambulatory Care Coordination Note  2023    Patient Current Location:  Home: [facility] [address]    ACM contacted the patient by telephone. Verified name and  with patient as identifiers. Provided introduction to self, and explanation of the ACM role. ACM: Dony Saba RN    Challenges to be reviewed by the provider   Additional needs identified to be addressed with provider: No  none               Method of communication with provider: none. Ambulatory Care Coordination Assessment    Care Coordination Protocol  Referral from Primary Care Provider: No  Week 1 - Initial Assessment     Do you have all of your prescriptions and are they filled?: Yes  Barriers to medication adherence: None  Are you able to afford your medications?: Yes  How often do you have trouble taking your medications the way you have been told to take them?: I always take them as prescribed.                           Suggested Interventions and Community Resources                  Future Appointments   Date Time Provider [address] [place] [address] Ct   10/31/2023  9:40 AM Onesimo Barkley MD [facility]   2024 10:20 AM Uriah Mendez MD [facility] AMB      and   General Assessment    Do you have any symptoms that are causing concern?: Yes  Progression since Onset: Gradually Improving  Reported Symptoms: Pain

## 2023-08-08 ENCOUNTER — TELEPHONE (OUTPATIENT)
Dept: INTERNAL MEDICINE CLINIC | Facility: CLINIC | Age: 73
End: 2023-08-08

## 2023-08-08 NOTE — TELEPHONE ENCOUNTER
----- Message from Abeba Teixeira Stamp sent at 8/7/2023  4:37 PM EDT -----  Regarding: ???  Contact: [phone]  I had a virtual visit with Dr. Swapna Mendez on 7/27/2023 and he prescribed an antibiotic for 7 days. I finished the medicine but did not feel any better. I called your office four times on 8/3/2023 but the phone system was apparently experiencing problems because it kept saying that an error had occurred in transferring my call and then would disconnect. Later that day, I felt so bad that I drove myself to the [facility] where they did blood work, a chest x-ray, a covid test which was negative, and urinalysis. Since my neck was still hurting, they diagnosed me with an acute neck strain and an acute upper respiratory infection. The only medication that was prescribed was a low dosage muscle relaxer. I still have a low grade fever (99 or 99.1) whenever I stop taking Ibuprofen or Tylenol. Should I have another round of antibiotics? Should I come in for a visit? Or should I see my ENT because I have Eustachian tube disfunction which could possibly be causing my problems? I have   been dealing with this for more than two weeks and would like to feel better. I would appreciate any information you can give me. Thanks.                                     Sanford Cobb

## 2023-08-08 NOTE — TELEPHONE ENCOUNTER
Patient was called and offered vv with Dr. Lucas Farfan 8/8/23 and declined. Patient stated she has ENT appointment next week and will wait for that apt.

## 2023-08-21 ENCOUNTER — PATIENT MESSAGE (OUTPATIENT)
Dept: INTERNAL MEDICINE CLINIC | Facility: CLINIC | Age: 73
End: 2023-08-21

## 2023-08-21 RX ORDER — PAROXETINE 30 MG/1
30 TABLET, FILM COATED ORAL DAILY
Qty: 90 TABLET | Refills: 3 | Status: SHIPPED | OUTPATIENT
Start: 2023-08-21

## 2023-08-21 NOTE — TELEPHONE ENCOUNTER
From: Leilani Hewitt  To: Dr. Clarisa Hudsonters: 8/21/2023 1:35 PM EDT  Subject: Paxil 30 mg    What do I need to do to refill this prescription?   Marsha Butts  [phone] [employer] Counseling:  I discussed with the patient the risks of mycophenolate mofetil including but not limited to infection/immunosuppression, GI upset, hypokalemia, hypercholesterolemia, bone marrow suppression, lymphoproliferative disorders, malignancy, GI ulceration/bleed/perforation, colitis, interstitial lung disease, kidney failure, progressive multifocal leukoencephalopathy, and birth defects.  The patient understands that monitoring is required including a baseline creatinine and regular CBC testing. In addition, patient must alert us immediately if symptoms of infection or other concerning signs are noted.

## 2023-08-31 ENCOUNTER — CARE COORDINATION (OUTPATIENT)
Dept: CARE COORDINATION | Facility: CLINIC | Age: 73
End: 2023-08-31

## 2023-08-31 NOTE — CARE COORDINATION
Outreached to patient for f/u CCM assessment of ED visit on 8/3/2023 for upper respiratory infection  Patient states that she is doing much better at this time and symptoms have resolved  Patient did get her paxil prescription filled  No further CCM needs voiced or identified at this time  Case closed and patient graduated

## 2023-10-17 ENCOUNTER — TELEMEDICINE (OUTPATIENT)
Dept: INTERNAL MEDICINE CLINIC | Facility: CLINIC | Age: 73
End: 2023-10-17
Payer: MEDICARE

## 2023-10-17 DIAGNOSIS — G89.29 CHRONIC NECK PAIN: ICD-10-CM

## 2023-10-17 DIAGNOSIS — J32.9 RECURRENT SINUSITIS: Primary | ICD-10-CM

## 2023-10-17 DIAGNOSIS — M54.2 CHRONIC NECK PAIN: ICD-10-CM

## 2023-10-17 PROCEDURE — G8420 CALC BMI NORM PARAMETERS: HCPCS | Performed by: INTERNAL MEDICINE

## 2023-10-17 PROCEDURE — 1090F PRES/ABSN URINE INCON ASSESS: CPT | Performed by: INTERNAL MEDICINE

## 2023-10-17 PROCEDURE — 99214 OFFICE O/P EST MOD 30 MIN: CPT | Performed by: INTERNAL MEDICINE

## 2023-10-17 PROCEDURE — 1036F TOBACCO NON-USER: CPT | Performed by: INTERNAL MEDICINE

## 2023-10-17 PROCEDURE — G8399 PT W/DXA RESULTS DOCUMENT: HCPCS | Performed by: INTERNAL MEDICINE

## 2023-10-17 PROCEDURE — 3017F COLORECTAL CA SCREEN DOC REV: CPT | Performed by: INTERNAL MEDICINE

## 2023-10-17 PROCEDURE — 1123F ACP DISCUSS/DSCN MKR DOCD: CPT | Performed by: INTERNAL MEDICINE

## 2023-10-17 PROCEDURE — G8484 FLU IMMUNIZE NO ADMIN: HCPCS | Performed by: INTERNAL MEDICINE

## 2023-10-17 PROCEDURE — G8427 DOCREV CUR MEDS BY ELIG CLIN: HCPCS | Performed by: INTERNAL MEDICINE

## 2023-10-17 ASSESSMENT — ENCOUNTER SYMPTOMS
SHORTNESS OF BREATH: 0
ABDOMINAL PAIN: 0

## 2023-10-17 NOTE — PROGRESS NOTES
10/17/2023    TELEHEALTH EVALUATION -- Audio/Visual    HPI:    Harriet Aggarwal (:  1950) has requested an audio/video evaluation for the following concern(s):    Headache, congestion, low grade fever, with cough, COVID test negative; has on and off symptoms since end of July  Chronic neck pain since more than 3 months - has poor posture, but no trauma  Depression - on Paxil    Review of Systems   Respiratory:  Negative for shortness of breath. Cardiovascular:  Negative for chest pain and leg swelling. Gastrointestinal:  Negative for abdominal pain. Psychiatric/Behavioral:  Negative for behavioral problems and confusion. Prior to Visit Medications    Medication Sig Taking?  Authorizing Provider   PARoxetine (PAXIL) 30 MG tablet Take 1 tablet by mouth daily Yes Uri Mejía MD   NONFORMULARY Take 10 mg by mouth in the morning and at bedtime DomperidoneDomperidone Yes ProviderNeli MD   triamcinolone acetonide (KENALOG) 10 MG/ML injection Inject 1 mL into the articular space once EVERY 6 MO Yes Provider, MD Neli   calcium carbonate-vitamin D (CALTRATE) 600-400 MG-UNIT TABS per tab Take 1 tablet by mouth 2 times daily Yes Automatic Reconciliation, Ar   loratadine (CLARITIN) 10 MG tablet Take 1 tablet by mouth daily Yes Automatic Reconciliation, Ar   tretinoin (RETIN-A) 0.025 % cream APPLY PEA SIZE AMOUNT TO THE FACE NIGHTLY, START EVERY THIRD NIGHT AND INCREASE AS TOLERATED Yes Automatic Reconciliation, Ar   zoledronic acid (RECLAST) 5 MG/100ML SOLN Infuse 100 mLs intravenously once Yes Automatic Reconciliation, Ar       Social History     Tobacco Use    Smoking status: Never    Smokeless tobacco: Never   Substance Use Topics    Alcohol use: No    Drug use: No        Allergies   Allergen Reactions    Metoclopramide Other (See Comments)     CNS reaction(s)  Agitation      Penicillins Rash    Clarithromycin Other (See Comments)     Due to domperidone    Erythromycin Other (See

## 2023-10-20 ENCOUNTER — HOSPITAL ENCOUNTER (OUTPATIENT)
Dept: CT IMAGING | Age: 73
Discharge: HOME | End: 2023-10-23
Attending: INTERNAL MEDICINE

## 2023-10-20 ENCOUNTER — HOSPITAL ENCOUNTER (OUTPATIENT)
Dept: GENERAL RADIOLOGY | Age: 73
Discharge: HOME | End: 2023-10-23
Attending: INTERNAL MEDICINE

## 2023-10-20 DIAGNOSIS — J32.9 RECURRENT SINUSITIS: ICD-10-CM

## 2023-10-20 DIAGNOSIS — M54.2 CHRONIC NECK PAIN: ICD-10-CM

## 2023-10-20 DIAGNOSIS — G89.29 CHRONIC NECK PAIN: ICD-10-CM

## 2023-10-26 ENCOUNTER — HOSPITAL ENCOUNTER (OUTPATIENT)
Dept: PHYSICAL THERAPY | Age: 73
Setting detail: RECURRING SERIES
Discharge: HOME | End: 2023-10-29
Attending: INTERNAL MEDICINE
Payer: MEDICARE

## 2023-10-26 DIAGNOSIS — M54.2 CERVICALGIA: Primary | ICD-10-CM

## 2023-10-26 DIAGNOSIS — E78.49 OTHER HYPERLIPIDEMIA: ICD-10-CM

## 2023-10-26 DIAGNOSIS — M62.81 MUSCLE WEAKNESS (GENERALIZED): ICD-10-CM

## 2023-10-26 DIAGNOSIS — R79.89 ELEVATED LFTS: ICD-10-CM

## 2023-10-26 DIAGNOSIS — M85.80 OSTEOPENIA, UNSPECIFIED LOCATION: ICD-10-CM

## 2023-10-26 LAB
25(OH)D3 SERPL-MCNC: 29.8 NG/ML (ref 30–100)
ALBUMIN SERPL-MCNC: 3.8 G/DL (ref 3.2–4.6)
ALBUMIN/GLOB SERPL: 1.4 (ref 0.4–1.6)
ALP SERPL-CCNC: 43 U/L (ref 50–136)
ALT SERPL-CCNC: 17 U/L (ref 12–65)
ANION GAP SERPL CALC-SCNC: 5 MMOL/L (ref 2–11)
AST SERPL-CCNC: 21 U/L (ref 15–37)
BASOPHILS # BLD: 0.1 K/UL (ref 0–0.2)
BASOPHILS NFR BLD: 1 % (ref 0–2)
BILIRUB SERPL-MCNC: 1 MG/DL (ref 0.2–1.1)
BUN SERPL-MCNC: 14 MG/DL (ref 8–23)
CALCIUM SERPL-MCNC: 9.3 MG/DL (ref 8.3–10.4)
CHLORIDE SERPL-SCNC: 108 MMOL/L (ref 101–110)
CHOLEST SERPL-MCNC: 202 MG/DL
CO2 SERPL-SCNC: 29 MMOL/L (ref 21–32)
CREAT SERPL-MCNC: 0.7 MG/DL (ref 0.6–1)
DIFFERENTIAL METHOD BLD: ABNORMAL
EOSINOPHIL # BLD: 0.1 K/UL (ref 0–0.8)
EOSINOPHIL NFR BLD: 2 % (ref 0.5–7.8)
ERYTHROCYTE [DISTWIDTH] IN BLOOD BY AUTOMATED COUNT: 13.2 % (ref 11.9–14.6)
GLOBULIN SER CALC-MCNC: 2.8 G/DL (ref 2.8–4.5)
GLUCOSE SERPL-MCNC: 85 MG/DL (ref 65–100)
HCT VFR BLD AUTO: 39.1 % (ref 35.8–46.3)
HDLC SERPL-MCNC: 78 MG/DL (ref 40–60)
HDLC SERPL: 2.6
HGB BLD-MCNC: 12.5 G/DL (ref 11.7–15.4)
IMM GRANULOCYTES # BLD AUTO: 0 K/UL (ref 0–0.5)
IMM GRANULOCYTES NFR BLD AUTO: 0 % (ref 0–5)
LDLC SERPL CALC-MCNC: 107.6 MG/DL
LYMPHOCYTES # BLD: 1.3 K/UL (ref 0.5–4.6)
LYMPHOCYTES NFR BLD: 19 % (ref 13–44)
MCH RBC QN AUTO: 31.6 PG (ref 26.1–32.9)
MCHC RBC AUTO-ENTMCNC: 32 G/DL (ref 31.4–35)
MCV RBC AUTO: 99 FL (ref 82–102)
MONOCYTES # BLD: 0.6 K/UL (ref 0.1–1.3)
MONOCYTES NFR BLD: 9 % (ref 4–12)
NEUTS SEG # BLD: 4.4 K/UL (ref 1.7–8.2)
NEUTS SEG NFR BLD: 69 % (ref 43–78)
NRBC # BLD: 0 K/UL (ref 0–0.2)
PLATELET # BLD AUTO: 242 K/UL (ref 150–450)
PMV BLD AUTO: 10.4 FL (ref 9.4–12.3)
POTASSIUM SERPL-SCNC: 4.4 MMOL/L (ref 3.5–5.1)
PROT SERPL-MCNC: 6.6 G/DL (ref 6.3–8.2)
RBC # BLD AUTO: 3.95 M/UL (ref 4.05–5.2)
SODIUM SERPL-SCNC: 142 MMOL/L (ref 133–143)
TRIGL SERPL-MCNC: 82 MG/DL (ref 35–150)
TSH, 3RD GENERATION: 1.5 UIU/ML (ref 0.36–3.74)
VLDLC SERPL CALC-MCNC: 16.4 MG/DL (ref 6–23)
WBC # BLD AUTO: 6.5 K/UL (ref 4.3–11.1)

## 2023-10-26 PROCEDURE — 97161 PT EVAL LOW COMPLEX 20 MIN: CPT

## 2023-10-26 PROCEDURE — 97110 THERAPEUTIC EXERCISES: CPT

## 2023-10-26 PROCEDURE — 97140 MANUAL THERAPY 1/> REGIONS: CPT

## 2023-10-26 NOTE — PROGRESS NOTES
Suzi Serrano  : 1950  Primary: Medicare Part A [place] B (Medicare)  Secondary: [address] [facility]  [address]  [address]  [address]  Phone: [phone]  Fax: [phone] Plan Frequency: 1-2x/week x 90 days  Plan of Care/Certification Expiration Date: 24        Plan of Care/Certification Expiration Date:  Plan of Care/Certification Expiration Date: 24    Time In/Out:   Time In: 1035  Time Out: 1120  PT Visit Info:    Plan Frequency: 1-2x/week x 90 days  Total # of Visits to Date: 1      Visit Count:  1    OUTPATIENT PHYSICAL THERAPY:   Treatment Note 10/26/2023       Charge Capture   Episode  (Neck pain)               Treatment Diagnosis:    Cervicalgia  Muscle weakness (generalized)  Medical/Referring Diagnosis:    Chronic neck pain    Referring Physician:  Ashwini Du MD MD Orders:  PT Eval and Treat   Return MD Appt:  10/31/23   Date of Onset:  Onset Date: 23     Allergies:   Metoclopramide, Penicillins, Clarithromycin, Erythromycin, and Adhesive tape  Restrictions/Precautions:   None      Interventions Planned (Treatment may consist of any combination of the following):     See Assessment Note  Subjective Comments:   Patient c/o neck pain, stiffness and lack of ROM that began in July and has worsened since that time. Initial Pain Level[de-identified]   Neck 6/10  Post Session Pain Level:     Neck 4/10  Medications Last Reviewed:  10/26/2023  Updated Objective Findings:  See Evaluation Note from today  Treatment   THERAPEUTIC EXERCISE: (10 minutes):    Exercises per grid below to improve mobility, strength, and posture . Required moderate visual, verbal, and tactile cues to promote proper body alignment, promote proper body posture, promote proper body mechanics, and promote proper body breathing techniques. Progressed resistance, range, repetitions, and complexity of movement as indicated.   MANUAL THERAPY: (15 minutes):   Joint mobilization and

## 2023-10-26 NOTE — THERAPY EVALUATION
Ronna Tejada  : 1950  Primary: Medicare Part A [place] B (Medicare)  Secondary: [address] [facility]  [address]  [address]  [address]  Phone: [phone]  Fax: [phone] Plan Frequency: 1-2x/week x 90 days  Plan of Care/Certification Expiration Date: 24        Plan of Care/Certification Expiration Date:  Plan of Care/Certification Expiration Date: 24    Time In/Out:  20 minutes initial assessment; see daily treatment note  Time In: 1035  Time Out: 1120  PT Visit Info:    Plan Frequency: 1-2x/week x 90 days  Total # of Visits to Date: 1      Visit Count:  1                OUTPATIENT PHYSICAL THERAPY:             Initial Assessment 10/26/2023               Charge Capture   Episode (Neck pain)         Treatment Diagnosis:     Cervicalgia  Muscle weakness (generalized)  Medical/Referring Diagnosis:    Chronic neck pain    Referring Physician:  Uri Mejía MD MD Orders:  PT Eval and Treat   Return MD Appt:  10/31/23  Date of Onset:  Onset Date: 23     Allergies:  Metoclopramide, Penicillins, Clarithromycin, Erythromycin, and Adhesive tape  Restrictions/Precautions:    None      Medications Last Reviewed:  10/26/2023     SUBJECTIVE   History of Injury/Illness (Reason for Referral):  Patient reports she woke up on 23 with neck pain and stiffness that has worsened over the last several months. She reports very guarded with movement and driving, reading and working on her phone have become difficult. She states she takes Advil to help manage her pain. Patient Stated Goal(s):  \"reduced pain and more flexibility\"  Initial Pain Level:   Neck 6/10   Post Session Pain Level:   Neck 4/10  Past Medical History/Comorbidities:   Ms. Tram Verma  has a past medical history of Depression, Gastroparesis, Hearing loss, Osteopenia, Osteoporosis, Otosclerosis, Pleurisy, Rheumatic fever, Tinnitus, and Unspecified adverse effect of anesthesia. Ms. Tram Verma  has

## 2023-10-29 ASSESSMENT — PAIN SCALES - GENERAL: PAINLEVEL_OUTOF10: 6

## 2023-10-29 ASSESSMENT — PAIN DESCRIPTION - LOCATION: LOCATION: NECK

## 2023-10-29 ASSESSMENT — PAIN DESCRIPTION - PAIN TYPE: TYPE: CHRONIC PAIN

## 2023-10-30 ASSESSMENT — PATIENT HEALTH QUESTIONNAIRE - PHQ9
9. THOUGHTS THAT YOU WOULD BE BETTER OFF DEAD, OR OF HURTING YOURSELF: 0
5. POOR APPETITE OR OVEREATING: NOT AT ALL
2. FEELING DOWN, DEPRESSED OR HOPELESS: 1
10. IF YOU CHECKED OFF ANY PROBLEMS, HOW DIFFICULT HAVE THESE PROBLEMS MADE IT FOR YOU TO DO YOUR WORK, TAKE CARE OF THINGS AT HOME, OR GET ALONG WITH OTHER PEOPLE: 0
SUM OF ALL RESPONSES TO PHQ QUESTIONS 1-9: 2
SUM OF ALL RESPONSES TO PHQ QUESTIONS 1-9: 2
3. TROUBLE FALLING OR STAYING ASLEEP: NOT AT ALL
SUM OF ALL RESPONSES TO PHQ9 QUESTIONS 1 & 2: 1
7. TROUBLE CONCENTRATING ON THINGS, SUCH AS READING THE NEWSPAPER OR WATCHING TELEVISION: 0
SUM OF ALL RESPONSES TO PHQ QUESTIONS 1-9: 2
7. TROUBLE CONCENTRATING ON THINGS, SUCH AS READING THE NEWSPAPER OR WATCHING TELEVISION: NOT AT ALL
8. MOVING OR SPEAKING SO SLOWLY THAT OTHER PEOPLE COULD HAVE NOTICED. OR THE OPPOSITE, BEING SO FIGETY OR RESTLESS THAT YOU HAVE BEEN MOVING AROUND A LOT MORE THAN USUAL: 0
9. THOUGHTS THAT YOU WOULD BE BETTER OFF DEAD, OR OF HURTING YOURSELF: NOT AT ALL
5. POOR APPETITE OR OVEREATING: 0
2. FEELING DOWN, DEPRESSED OR HOPELESS: SEVERAL DAYS
6. FEELING BAD ABOUT YOURSELF - OR THAT YOU ARE A FAILURE OR HAVE LET YOURSELF OR YOUR FAMILY DOWN: NOT AT ALL
6. FEELING BAD ABOUT YOURSELF - OR THAT YOU ARE A FAILURE OR HAVE LET YOURSELF OR YOUR FAMILY DOWN: 0
1. LITTLE INTEREST OR PLEASURE IN DOING THINGS: NOT AT ALL
3. TROUBLE FALLING OR STAYING ASLEEP: 0
1. LITTLE INTEREST OR PLEASURE IN DOING THINGS: 0
4. FEELING TIRED OR HAVING LITTLE ENERGY: 1
4. FEELING TIRED OR HAVING LITTLE ENERGY: SEVERAL DAYS
8. MOVING OR SPEAKING SO SLOWLY THAT OTHER PEOPLE COULD HAVE NOTICED. OR THE OPPOSITE - BEING SO FIDGETY OR RESTLESS THAT YOU HAVE BEEN MOVING AROUND A LOT MORE THAN USUAL: NOT AT ALL
SUM OF ALL RESPONSES TO PHQ QUESTIONS 1-9: 2
10. IF YOU CHECKED OFF ANY PROBLEMS, HOW DIFFICULT HAVE THESE PROBLEMS MADE IT FOR YOU TO DO YOUR WORK, TAKE CARE OF THINGS AT HOME, OR GET ALONG WITH OTHER PEOPLE: NOT DIFFICULT AT ALL
SUM OF ALL RESPONSES TO PHQ QUESTIONS 1-9: 2

## 2023-10-31 ENCOUNTER — OFFICE VISIT (OUTPATIENT)
Dept: INTERNAL MEDICINE CLINIC | Facility: CLINIC | Age: 73
End: 2023-10-31
Payer: MEDICARE

## 2023-10-31 VITALS
SYSTOLIC BLOOD PRESSURE: 110 MMHG | HEART RATE: 89 BPM | DIASTOLIC BLOOD PRESSURE: 62 MMHG | HEIGHT: 63 IN | OXYGEN SATURATION: 97 % | BODY MASS INDEX: 19.03 KG/M2 | WEIGHT: 107.4 LBS

## 2023-10-31 DIAGNOSIS — F32.A DEPRESSION, UNSPECIFIED DEPRESSION TYPE: Primary | ICD-10-CM

## 2023-10-31 PROCEDURE — G8399 PT W/DXA RESULTS DOCUMENT: HCPCS | Performed by: INTERNAL MEDICINE

## 2023-10-31 PROCEDURE — 1090F PRES/ABSN URINE INCON ASSESS: CPT | Performed by: INTERNAL MEDICINE

## 2023-10-31 PROCEDURE — 1123F ACP DISCUSS/DSCN MKR DOCD: CPT | Performed by: INTERNAL MEDICINE

## 2023-10-31 PROCEDURE — 3017F COLORECTAL CA SCREEN DOC REV: CPT | Performed by: INTERNAL MEDICINE

## 2023-10-31 PROCEDURE — 99213 OFFICE O/P EST LOW 20 MIN: CPT | Performed by: INTERNAL MEDICINE

## 2023-10-31 PROCEDURE — 1036F TOBACCO NON-USER: CPT | Performed by: INTERNAL MEDICINE

## 2023-10-31 PROCEDURE — G8420 CALC BMI NORM PARAMETERS: HCPCS | Performed by: INTERNAL MEDICINE

## 2023-10-31 PROCEDURE — G8427 DOCREV CUR MEDS BY ELIG CLIN: HCPCS | Performed by: INTERNAL MEDICINE

## 2023-10-31 PROCEDURE — 90694 VACC AIIV4 NO PRSRV 0.5ML IM: CPT | Performed by: INTERNAL MEDICINE

## 2023-10-31 PROCEDURE — G0008 ADMIN INFLUENZA VIRUS VAC: HCPCS | Performed by: INTERNAL MEDICINE

## 2023-10-31 PROCEDURE — G8484 FLU IMMUNIZE NO ADMIN: HCPCS | Performed by: INTERNAL MEDICINE

## 2023-10-31 ASSESSMENT — ENCOUNTER SYMPTOMS
COUGH: 0
SHORTNESS OF BREATH: 0
ABDOMINAL PAIN: 0

## 2023-10-31 NOTE — PROGRESS NOTES
SUBJECTIVE:   Rola Anne is a 74 y.o. female seen for a visit regarding   Chief Complaint   Patient presents with    Follow-up        HPI      Anxiety with depression - on Paxil 30mg  Saw a Dermatologist  Chronic neck pain - seeing PT with help  Right hip pain for 2 weeks, no trauma, able to walk - advise to call back if not getting better in 1month  Osteoporosis, Vitamin d  Deficiency - on Reclast, on Vitamin D supplement, saw Rheumatology  Eustachian tube dysfunction, hearing loss, Otosclerosis - Sees ENT Dr. Brendon Renteria ENT, has hearing aids  H/o gastroparesis - Zofran as needed, had ticks on reglan, seeing GI Dr. Semaj Moraes - waiting for colonoscopy appt. Patient still wants to wait on Shingles vaccine at this time  Fatty liver mild on Ultrasound  Ultrasound renal has a possible abnormality - possible angiomyolipoma, no abdominal pain, CT abdomen pending still - given number to call radiology  Recommend tdap booster     Past Medical History, Past Surgical History, Family history, Social History, and Medications were all reviewed with the patient today and updated as necessary. Current Outpatient Medications   Medication Sig Dispense Refill    PARoxetine (PAXIL) 30 MG tablet Take 1 tablet by mouth daily 90 tablet 3    NONFORMULARY Take 10 mg by mouth in the morning and at bedtime DomperidoneDomperidone      triamcinolone acetonide (KENALOG) 10 MG/ML injection Inject 1 mL into the articular space once EVERY 6 MO      calcium carbonate-vitamin D (CALTRATE) 600-400 MG-UNIT TABS per tab Take 1 tablet by mouth 2 times daily      loratadine (CLARITIN) 10 MG tablet Take 1 tablet by mouth daily      tretinoin (RETIN-A) 0.025 % cream APPLY PEA SIZE AMOUNT TO THE FACE NIGHTLY, START EVERY THIRD NIGHT AND INCREASE AS TOLERATED      zoledronic acid (RECLAST) 5 MG/100ML SOLN Infuse 100 mLs intravenously once       No current facility-administered medications for this visit.      Allergies   Allergen Reactions

## 2023-11-03 NOTE — PROGRESS NOTES
Obed Evans  : 1950  Primary: Medicare Part A And B (Medicare)  Secondary: [address] [facility]  [address]  [address]  [place] [address]  Phone: [phone]  Fax: [phone] Plan Frequency: 1-2x/week x 90 days  Plan of Care/Certification Expiration Date: 24        Plan of Care/Certification Expiration Date:  Plan of Care/Certification Expiration Date: 24    Time In/Out: 45 minutes ( 35 min therex; 10 min manual)  Time In: 1515  Time Out: 1600  PT Visit Info:    Plan Frequency: 1-2x/week x 90 days  Total # of Visits to Date: 2      Visit Count:  2    OUTPATIENT PHYSICAL THERAPY:   Treatment Note 2023       Charge Capture   Episode  (Neck pain)               Treatment Diagnosis:    Cervicalgia  Muscle weakness (generalized)  Medical/Referring Diagnosis:    Chronic neck pain    Referring Physician:  Sis Estrada MD MD Orders:  PT Eval and Treat   Return MD Appt:  10/31/23   Date of Onset:  Onset Date: 23     Allergies:   Metoclopramide, Penicillins, Clarithromycin, Erythromycin, and Adhesive tape  Restrictions/Precautions:   None      Interventions Planned (Treatment may consist of any combination of the following):     See Assessment Note  Subjective Comments:   Patient reports some improved pain, stiffness and ROM compared to last week. She reports compliance with her HEP. Initial Pain Level[de-identified]     5/10  Post Session Pain Level:       3/10  Medications Last Reviewed:  2023  Updated Objective Findings:  None Today  Treatment   THERAPEUTIC EXERCISE: (35 minutes):    Exercises per grid below to improve mobility, strength, and posture . Required moderate visual, verbal, and tactile cues to promote proper body alignment, promote proper body posture, promote proper body mechanics, and promote proper body breathing techniques. Progressed resistance, range, repetitions, and complexity of movement as indicated.   MANUAL THERAPY: (10 minutes):

## 2023-11-06 ENCOUNTER — HOSPITAL ENCOUNTER (OUTPATIENT)
Dept: PHYSICAL THERAPY | Age: 73
Setting detail: RECURRING SERIES
Discharge: HOME | End: 2023-11-09
Attending: INTERNAL MEDICINE
Payer: MEDICARE

## 2023-11-06 PROCEDURE — 97140 MANUAL THERAPY 1/> REGIONS: CPT

## 2023-11-06 PROCEDURE — 97110 THERAPEUTIC EXERCISES: CPT

## 2023-11-06 ASSESSMENT — PAIN DESCRIPTION - PAIN TYPE: TYPE: CHRONIC PAIN

## 2023-11-06 ASSESSMENT — PAIN SCALES - GENERAL: PAINLEVEL_OUTOF10: 5

## 2023-11-08 ENCOUNTER — HOSPITAL ENCOUNTER (OUTPATIENT)
Dept: PHYSICAL THERAPY | Age: 73
Setting detail: RECURRING SERIES
Discharge: HOME | End: 2023-11-11
Attending: INTERNAL MEDICINE
Payer: MEDICARE

## 2023-11-08 PROCEDURE — 97110 THERAPEUTIC EXERCISES: CPT

## 2023-11-08 PROCEDURE — 97140 MANUAL THERAPY 1/> REGIONS: CPT

## 2023-11-08 ASSESSMENT — PAIN SCALES - GENERAL: PAINLEVEL_OUTOF10: 4

## 2023-11-08 NOTE — PROGRESS NOTES
Shoulder Horizontal Abduction with Anchored Resistance  - 2 x daily - 7 x weekly - 1 sets - 10 reps  - Scapular Retraction with Resistance  - 2 x daily - 7 x weekly - 1 sets - 10 reps  - Shoulder External Rotation and Scapular Retraction with Resistance  - 2 x daily - 7 x weekly - 1 sets - 10 reps  Access Code: [email or web address]  URL: [email or web address]  Date: 10/26/2023  Prepared by: Danny Franco    Exercises  - Standing Cervical Flexion AROM  - 2 x daily - 7 x weekly - 2 sets - 10 reps  - Standing Cervical Rotation AROM  - 2 x daily - 7 x weekly - 2 sets - 10 reps  - Seated Scapular Retraction  - 2 x daily - 7 x weekly - 2 sets - 10 reps  - Standing Backward Shoulder Rolls  - 2 x daily - 7 x weekly - 2 sets - 10 reps  - Seated Shoulder Shrugs  - 2 x daily - 7 x weekly - 2 sets - 10 reps  - Standing Cervical Retraction  - 2 x daily - 7 x weekly - 2 sets - 10 reps  - Supine Cervical Retraction with Towel  - 2 x daily - 7 x weekly - 2 sets - 10 reps  Treatment/Session Summary:    Treatment Assessment:   Patient did well with above exercises, therefore I added those to his HEP. Improved pain levels and tightness after manual therapy. Communication/Consultation:   HEP 2x daily; Bring phone/ipad up to eye level  Equipment provided today:  None  Recommendations/Intent for next treatment session: Next visit will focus on advancements to more challenging postural stretching, strengthening and ROM exercises, and manual therapy if needed.     >Total Treatment Billable Duration:  55 minutes ( 40 min therex; 15 min manual)  Time In: 1030  Time Out: 7463 [facility], PT         Charge Capture  [email or web address] Portal  Appt Desk     Future Appointments   Date Time Provider [address]   11/13/2023 11:15 AM Veola Quince, PT [facility]   11/15/2023 10:30 AM Veola Quince, PT [facility]   11/20/2023 11:15 AM Veola Quince, PT [facility]   11/22/2023 10:30 AM Veola Quince, PT [facility]

## 2023-11-13 ENCOUNTER — HOSPITAL ENCOUNTER (OUTPATIENT)
Dept: PHYSICAL THERAPY | Age: 73
Setting detail: RECURRING SERIES
Discharge: HOME | End: 2023-11-16
Attending: INTERNAL MEDICINE
Payer: MEDICARE

## 2023-11-13 PROCEDURE — 97110 THERAPEUTIC EXERCISES: CPT

## 2023-11-13 PROCEDURE — 97140 MANUAL THERAPY 1/> REGIONS: CPT

## 2023-11-13 ASSESSMENT — PAIN SCALES - GENERAL: PAINLEVEL_OUTOF10: 3

## 2023-11-15 ENCOUNTER — HOSPITAL ENCOUNTER (OUTPATIENT)
Dept: PHYSICAL THERAPY | Age: 73
Setting detail: RECURRING SERIES
Discharge: HOME | End: 2023-11-18
Attending: INTERNAL MEDICINE
Payer: MEDICARE

## 2023-11-15 PROCEDURE — 97140 MANUAL THERAPY 1/> REGIONS: CPT

## 2023-11-15 PROCEDURE — 97110 THERAPEUTIC EXERCISES: CPT

## 2023-11-15 ASSESSMENT — PAIN DESCRIPTION - PAIN TYPE: TYPE: CHRONIC PAIN

## 2023-11-15 ASSESSMENT — PAIN DESCRIPTION - LOCATION: LOCATION: NECK

## 2023-11-15 ASSESSMENT — PAIN SCALES - GENERAL: PAINLEVEL_OUTOF10: 0

## 2023-11-15 NOTE — PROGRESS NOTES
Evangelina Mac  : 1950  Primary: Medicare Part A And B (Medicare)  Secondary: [address] [facility]  [address]  [address]  [place] [address]  Phone: [phone]  Fax: [phone] Plan Frequency: 1-2x/week x 90 days  Plan of Care/Certification Expiration Date: 24        Plan of Care/Certification Expiration Date:  Plan of Care/Certification Expiration Date: 24    Time In/Out:  40 minutes ( 25 min therex; 15 min manual)  Time In: 1030  Time Out: 1130  PT Visit Info:    Plan Frequency: 1-2x/week x 90 days  Total # of Visits to Date: 5      Visit Count:  5    OUTPATIENT PHYSICAL THERAPY:   Treatment Note 11/15/2023       Charge Capture   Episode  (Neck pain)               Treatment Diagnosis:    Cervicalgia  Muscle weakness (generalized)  Medical/Referring Diagnosis:    Chronic neck pain    Referring Physician:  Kevin Meier MD MD Orders:  PT Eval and Treat   Return MD Appt:  10/31/23   Date of Onset:  Onset Date: 23     Allergies:   Metoclopramide, Penicillins, Clarithromycin, Erythromycin, and Adhesive tape  Restrictions/Precautions:   None      Interventions Planned (Treatment may consist of any combination of the following):     See Assessment Note  Subjective Comments:   Patient reports she's had no neck pain  x 24 hours. She reports continued compliance with HEP. Initial Pain Level[de-identified]   Neck 0/10  Post Session Pain Level:     Neck 1/10  Medications Last Reviewed:  11/15/2023  Updated Objective Findings:  None Today  Treatment   THERAPEUTIC EXERCISE: (25 minutes):    Exercises per grid below to improve mobility, strength, and posture . Required moderate visual, verbal, and tactile cues to promote proper body alignment, promote proper body posture, promote proper body mechanics, and promote proper body breathing techniques. Progressed resistance, range, repetitions, and complexity of movement as indicated.   MANUAL THERAPY: (15 minutes):   Joint Xerosis Aggressive Treatment: I recommended application of Cetaphil or CeraVe numerous times a day going to bed to all dry areas. I also prescribed a topical steroid for twice daily use.

## 2023-11-16 NOTE — PROGRESS NOTES
Massimo Talavera  : 1950  Primary: Medicare Part A And B (Medicare)  Secondary: [address] [facility]  [address]  [address] [place] [address]  Phone: [phone]  Fax: [phone] Plan Frequency: 1-2x/week x 90 days  Plan of Care/Certification Expiration Date: 24        Plan of Care/Certification Expiration Date:  Plan of Care/Certification Expiration Date: 24    Time In/Out:  40 minutes ( 25 min therex; 15 min manual)  Time In: 1120  Time Out: 1230  PT Visit Info:    Plan Frequency: 1-2x/week x 90 days  Total # of Visits to Date: 6      Visit Count:  6    OUTPATIENT PHYSICAL THERAPY:   Treatment Note 2023       Charge Capture   Episode  (Neck pain)               Treatment Diagnosis:    Cervicalgia  Muscle weakness (generalized)  Medical/Referring Diagnosis:    Chronic neck pain    Referring Physician:  Denisse Cassidy MD MD Orders:  PT Eval and Treat   Return MD Appt:  10/31/23   Date of Onset:  Onset Date: 23     Allergies:   Metoclopramide, Penicillins, Clarithromycin, Erythromycin, and Adhesive tape  Restrictions/Precautions:   None      Interventions Planned (Treatment may consist of any combination of the following):     See Assessment Note  Subjective Comments:   Patient reports she had 3 painfree days after last visit, then her pain returned over the weekend. She states she has been on the phone more and stressed so that is likely contributing to her pain levels. Initial Pain Level[de-identified]   Neck 3/10  Post Session Pain Level:     Neck 1/10  Medications Last Reviewed:  2023  Updated Objective Findings:  None Today  Treatment   THERAPEUTIC EXERCISE: (25 minutes):    Exercises per grid below to improve mobility, strength, and posture . Required moderate visual, verbal, and tactile cues to promote proper body alignment, promote proper body posture, promote proper body mechanics, and promote proper body breathing techniques.   Progressed

## 2023-11-20 ENCOUNTER — HOSPITAL ENCOUNTER (OUTPATIENT)
Dept: PHYSICAL THERAPY | Age: 73
Setting detail: RECURRING SERIES
Discharge: HOME | End: 2023-11-23
Attending: INTERNAL MEDICINE
Payer: MEDICARE

## 2023-11-20 PROCEDURE — 97140 MANUAL THERAPY 1/> REGIONS: CPT

## 2023-11-20 PROCEDURE — 97110 THERAPEUTIC EXERCISES: CPT

## 2023-11-20 ASSESSMENT — PAIN DESCRIPTION - LOCATION: LOCATION: NECK

## 2023-11-20 ASSESSMENT — PAIN DESCRIPTION - PAIN TYPE: TYPE: CHRONIC PAIN

## 2023-11-20 ASSESSMENT — PAIN SCALES - GENERAL: PAINLEVEL_OUTOF10: 3

## 2023-11-22 ENCOUNTER — HOSPITAL ENCOUNTER (OUTPATIENT)
Dept: PHYSICAL THERAPY | Age: 73
Setting detail: RECURRING SERIES
Discharge: HOME | End: 2023-11-25
Attending: INTERNAL MEDICINE
Payer: MEDICARE

## 2023-11-22 PROCEDURE — 97140 MANUAL THERAPY 1/> REGIONS: CPT

## 2023-11-22 PROCEDURE — 97110 THERAPEUTIC EXERCISES: CPT

## 2023-11-22 ASSESSMENT — PAIN DESCRIPTION - LOCATION: LOCATION: NECK

## 2023-11-22 ASSESSMENT — PAIN SCALES - GENERAL: PAINLEVEL_OUTOF10: 4

## 2023-11-22 ASSESSMENT — PAIN DESCRIPTION - PAIN TYPE: TYPE: CHRONIC PAIN

## 2023-11-22 NOTE — PROGRESS NOTES
Sabine Norris  : 1950  Primary: Medicare Part A And B (Medicare)  Secondary: [address] [facility]  [address]  [address]  [facility] [address]  Phone: [phone]  Fax: [phone] Plan Frequency: 1-2x/week x 90 days  Plan of Care/Certification Expiration Date: 24        Plan of Care/Certification Expiration Date:  Plan of Care/Certification Expiration Date: 24    Time In/Out:  45 minutes ( 30 min therex; 15 min manual)  Time In: 1030  Time Out: 1140  PT Visit Info:    Plan Frequency: 1-2x/week x 90 days  Total # of Visits to Date: 7      Visit Count:  7    OUTPATIENT PHYSICAL THERAPY:   Treatment Note 2023       Charge Capture   Episode  (Neck pain)               Treatment Diagnosis:    Cervicalgia  Muscle weakness (generalized)  Medical/Referring Diagnosis:    Chronic neck pain    Referring Physician:  Rodolfo Jimenez MD MD Orders:  PT Eval and Treat   Return MD Appt:  10/31/23   Date of Onset:  Onset Date: 23     Allergies:   Metoclopramide, Penicillins, Clarithromycin, Erythromycin, and Adhesive tape  Restrictions/Precautions:   None      Interventions Planned (Treatment may consist of any combination of the following):     See Assessment Note  Subjective Comments:     Patient reports overall pain has improved, however, a little more stiff and sore this week. Initial Pain Level[de-identified]   Neck 4/10  Post Session Pain Level:     Neck 2/10  Medications Last Reviewed:  2023  Updated Objective Findings:  None Today  Treatment   THERAPEUTIC EXERCISE: (30 minutes):    Exercises per grid below to improve mobility, strength, and posture . Required moderate visual, verbal, and tactile cues to promote proper body alignment, promote proper body posture, promote proper body mechanics, and promote proper body breathing techniques. Progressed resistance, range, repetitions, and complexity of movement as indicated.   MANUAL THERAPY: (15 minutes):   Joint

## 2023-11-22 NOTE — PROGRESS NOTES
Sosei  : 1950  Primary: Medicare Part A And B (Medicare)  Secondary: [address] [place] [address]  [address]  [place] [facility] [address]  Phone: [phone]  Fax: [phone] Plan Frequency: 1-2x/week x 90 days  Plan of Care/Certification Expiration Date: 24        Plan of Care/Certification Expiration Date:  Plan of Care/Certification Expiration Date: 24    Time In/Out:  60 minutes ( 45 min therex; 15 min manual)  Time In: 1115  Time Out: 1235  PT Visit Info:    Plan Frequency: 1-2x/week x 90 days  Total # of Visits to Date: 8      Visit Count:  8    OUTPATIENT PHYSICAL THERAPY:   Treatment Note 2023       Charge Capture   Episode  (Neck pain)               Treatment Diagnosis:    Cervicalgia  Muscle weakness (generalized)  Medical/Referring Diagnosis:    Chronic neck pain    Referring Physician:  Mary Jane Chicas MD MD Orders:  PT Eval and Treat   Return MD Appt:  10/31/23   Date of Onset:  Onset Date: 23     Allergies:   Metoclopramide, Penicillins, Clarithromycin, Erythromycin, and Adhesive tape  Restrictions/Precautions:   None      Interventions Planned (Treatment may consist of any combination of the following):     See Assessment Note  Subjective Comments:   Patient reports less pain than last week, but more than the week before. She reports compliance with her HEP. Initial Pain Level[de-identified] Right, Left Neck 2/10  Post Session Pain Level:  Right, Left  Neck 1/10  Medications Last Reviewed:  2023  Updated Objective Findings:  None Today  Treatment   THERAPEUTIC EXERCISE: (45 minutes):    Exercises per grid below to improve mobility, strength, and posture . Required moderate visual, verbal, and tactile cues to promote proper body alignment, promote proper body posture, promote proper body mechanics, and promote proper body breathing techniques.   Progressed resistance, range, repetitions, and complexity of movement as

## 2023-11-27 ENCOUNTER — HOSPITAL ENCOUNTER (OUTPATIENT)
Dept: PHYSICAL THERAPY | Age: 73
Setting detail: RECURRING SERIES
Discharge: HOME | End: 2023-11-30
Attending: INTERNAL MEDICINE
Payer: MEDICARE

## 2023-11-27 PROCEDURE — 97110 THERAPEUTIC EXERCISES: CPT

## 2023-11-27 PROCEDURE — 97140 MANUAL THERAPY 1/> REGIONS: CPT

## 2023-11-27 ASSESSMENT — PAIN DESCRIPTION - DESCRIPTORS: DESCRIPTORS: ACHING;TIGHTNESS

## 2023-11-27 ASSESSMENT — PAIN SCALES - GENERAL: PAINLEVEL_OUTOF10: 2

## 2023-11-27 ASSESSMENT — PAIN DESCRIPTION - ORIENTATION: ORIENTATION: RIGHT;LEFT

## 2023-11-27 ASSESSMENT — PAIN DESCRIPTION - LOCATION: LOCATION: NECK

## 2023-11-27 ASSESSMENT — PAIN DESCRIPTION - PAIN TYPE: TYPE: CHRONIC PAIN

## 2023-11-28 NOTE — PROGRESS NOTES
Candido Valentine  : 1950  Primary: Medicare Part A And B (Medicare)  Secondary: [address] [facility]  [address]  [address]  [address]  Phone: [phone]  Fax: [phone] Plan Frequency: 1-2x/week x 90 days  Plan of Care/Certification Expiration Date: 24        Plan of Care/Certification Expiration Date:  Plan of Care/Certification Expiration Date: 24    Time In/Out:    Time In: 1035  Time Out: 1140  PT Visit Info:    Plan Frequency: 1-2x/week x 90 days  Total # of Visits to Date: 9      Visit Count:  9    OUTPATIENT PHYSICAL THERAPY:   Treatment Note 2023       Charge Capture   Episode  (Neck pain)               Treatment Diagnosis:    Cervicalgia  Muscle weakness (generalized)  Medical/Referring Diagnosis:    Chronic neck pain    Referring Physician:  Pura Boateng MD MD Orders:  PT Eval and Treat   Return MD Appt:  10/31/23   Date of Onset:  Onset Date: 23     Allergies:   Metoclopramide, Penicillins, Clarithromycin, Erythromycin, and Adhesive tape  Restrictions/Precautions:   None      Interventions Planned (Treatment may consist of any combination of the following):     See Assessment Note  Subjective Comments:   Patient reports increased soreness and UT tigthness after addition of new exercises last visit. Initial Pain Level[de-identified] Right, Left Neck  /10  Post Session Pain Level:  Right, Left  Neck 1/10  Medications Last Reviewed:  2023  Updated Objective Findings:  None Today  Treatment   THERAPEUTIC EXERCISE: (30 minutes):    Exercises per grid below to improve mobility, strength, and posture . Required moderate visual, verbal, and tactile cues to promote proper body alignment, promote proper body posture, promote proper body mechanics, and promote proper body breathing techniques. Progressed resistance, range, repetitions, and complexity of movement as indicated.   MANUAL THERAPY: (15 minutes):   Joint mobilization and Soft

## 2023-11-29 ENCOUNTER — HOSPITAL ENCOUNTER (OUTPATIENT)
Dept: PHYSICAL THERAPY | Age: 73
Setting detail: RECURRING SERIES
Discharge: HOME | End: 2023-12-02
Attending: INTERNAL MEDICINE
Payer: MEDICARE

## 2023-11-29 PROCEDURE — 97140 MANUAL THERAPY 1/> REGIONS: CPT

## 2023-11-29 PROCEDURE — 97110 THERAPEUTIC EXERCISES: CPT

## 2023-11-29 ASSESSMENT — PAIN DESCRIPTION - PAIN TYPE: TYPE: CHRONIC PAIN

## 2023-11-29 ASSESSMENT — PAIN DESCRIPTION - DESCRIPTORS: DESCRIPTORS: ACHING;TIGHTNESS

## 2023-11-29 ASSESSMENT — PAIN DESCRIPTION - ORIENTATION: ORIENTATION: RIGHT;LEFT

## 2023-11-29 ASSESSMENT — PAIN DESCRIPTION - LOCATION: LOCATION: NECK

## 2023-11-30 NOTE — PROGRESS NOTES
Olamide Wright  : 1950  Primary: Medicare Part A And B (Medicare)  Secondary: [address] [place] [address]  [address]  [place] [address]  Phone: [phone]  Fax: [phone] Plan Frequency: 1-2x/week x 90 days  Plan of Care/Certification Expiration Date: 24        Plan of Care/Certification Expiration Date:  Plan of Care/Certification Expiration Date: 24    Time In/Out:    Time In: 1035  Time Out: 1135  PT Visit Info:    Plan Frequency: 1-2x/week x 90 days  Total # of Visits to Date: 10      Visit Count:  10    OUTPATIENT PHYSICAL THERAPY:   Treatment Note 2023       Charge Capture   Episode  (Neck pain)               Treatment Diagnosis:    Cervicalgia  Muscle weakness (generalized)  Medical/Referring Diagnosis:    Chronic neck pain    Referring Physician:  Ilya Joiner MD MD Orders:  PT Eval and Treat   Return MD Appt:  10/31/23   Date of Onset:  Onset Date: 23     Allergies:   Metoclopramide, Penicillins, Clarithromycin, Erythromycin, and Adhesive tape  Restrictions/Precautions:   None      Interventions Planned (Treatment may consist of any combination of the following):     See Assessment Note  Subjective Comments:      Patient reports overall her pain is much better, but she still has some R side pain and tightness when turning her head to the L. Initial Pain Level[de-identified] Right Neck 3/10  Post Session Pain Level:  Right  Neck 2/10  Medications Last Reviewed:  2023  Updated Objective Findings:  None Today  Treatment   THERAPEUTIC EXERCISE: (30 minutes):    Exercises per grid below to improve mobility, strength, and posture . Required moderate visual, verbal, and tactile cues to promote proper body alignment, promote proper body posture, promote proper body mechanics, and promote proper body breathing techniques. Progressed resistance, range, repetitions, and complexity of movement as indicated.   MANUAL THERAPY: (15 minutes):
referral,  Ellis Stone PT          Future Appointments   Date Time Provider [address]   12/6/2023 10:30 AM Ellis Stone PT [facility]   2/29/2024 12:00 PM Renata Vásquez MD [facility]   5/14/2024 10:30 AM Arnulfo Bloch, MD [address] [facility]       Charge Capture  Appt Desk

## 2023-12-04 ENCOUNTER — HOSPITAL ENCOUNTER (OUTPATIENT)
Dept: PHYSICAL THERAPY | Age: 73
Setting detail: RECURRING SERIES
Discharge: HOME | End: 2023-12-07
Attending: INTERNAL MEDICINE
Payer: MEDICARE

## 2023-12-04 PROCEDURE — 97110 THERAPEUTIC EXERCISES: CPT

## 2023-12-04 PROCEDURE — 97140 MANUAL THERAPY 1/> REGIONS: CPT

## 2023-12-04 ASSESSMENT — PAIN DESCRIPTION - PAIN TYPE: TYPE: CHRONIC PAIN

## 2023-12-04 ASSESSMENT — PAIN DESCRIPTION - ORIENTATION: ORIENTATION: RIGHT

## 2023-12-04 ASSESSMENT — PAIN DESCRIPTION - LOCATION: LOCATION: NECK

## 2023-12-04 ASSESSMENT — PAIN SCALES - GENERAL: PAINLEVEL_OUTOF10: 3

## 2023-12-06 ENCOUNTER — HOSPITAL ENCOUNTER (OUTPATIENT)
Dept: PHYSICAL THERAPY | Age: 73
Setting detail: RECURRING SERIES
Discharge: HOME | End: 2023-12-09
Attending: INTERNAL MEDICINE
Payer: MEDICARE

## 2023-12-06 PROCEDURE — 97110 THERAPEUTIC EXERCISES: CPT

## 2023-12-06 PROCEDURE — 97140 MANUAL THERAPY 1/> REGIONS: CPT

## 2023-12-06 ASSESSMENT — PAIN DESCRIPTION - PAIN TYPE: TYPE: CHRONIC PAIN

## 2023-12-06 ASSESSMENT — PAIN DESCRIPTION - LOCATION: LOCATION: NECK

## 2023-12-06 ASSESSMENT — PAIN SCALES - GENERAL: PAINLEVEL_OUTOF10: 4

## 2023-12-06 NOTE — PROGRESS NOTES
Renata Corporal  : 1950  Primary: Medicare Part A And B (Medicare)  Secondary: [address] [facility]  [address]  [address]  [address]  Phone: [phone]  Fax: [phone] Plan Frequency: 1-2x/week x 90 days  Plan of Care/Certification Expiration Date: 24        Plan of Care/Certification Expiration Date:  Plan of Care/Certification Expiration Date: 24    Time In/Out:    Time In: 1030  Time Out: 1130  PT Visit Info:    Plan Frequency: 1-2x/week x 90 days  Total # of Visits to Date: 11      Visit Count:  11    OUTPATIENT PHYSICAL THERAPY:   Treatment Note 2023       Charge Capture   Episode  (Neck pain)               Treatment Diagnosis:    Cervicalgia  Muscle weakness (generalized)  Medical/Referring Diagnosis:    Chronic neck pain    Referring Physician:  Hussein Raman MD MD Orders:  PT Eval and Treat   Return MD Appt:  10/31/23   Date of Onset:  Onset Date: 23     Allergies:   Metoclopramide, Penicillins, Clarithromycin, Erythromycin, and Adhesive tape  Restrictions/Precautions:   None      Interventions Planned (Treatment may consist of any combination of the following):     See Assessment Note  Subjective Comments:   PAtient reports more neck pain, stiffness and tightness this week. she's not sure if it worsened due to an increase in exercises in PT or due to decorating for [place]. Initial Pain Level[de-identified]   Neck 4/10  Post Session Pain Level:     Neck 2/10  Medications Last Reviewed:  2023  Updated Objective Findings:  None Today  Treatment   THERAPEUTIC EXERCISE: (15 minutes):    Exercises per grid below to improve mobility, strength, and posture . Required moderate visual, verbal, and tactile cues to promote proper body alignment, promote proper body posture, promote proper body mechanics, and promote proper body breathing techniques.   Progressed resistance, range, repetitions, and complexity of movement as

## 2023-12-20 ENCOUNTER — APPOINTMENT (OUTPATIENT)
Dept: PHYSICAL THERAPY | Age: 73
End: 2023-12-20
Attending: INTERNAL MEDICINE
Payer: MEDICARE

## 2023-12-22 NOTE — PROGRESS NOTES
India Hicks  : 1950  Primary: Medicare Part A And B (Medicare)  Secondary: [address] [facility]  [address]  [address]  [address]  Phone: [phone]  Fax: [phone] Plan Frequency: 1-2x/week x 90 days  Plan of Care/Certification Expiration Date: 24        Plan of Care/Certification Expiration Date:  Plan of Care/Certification Expiration Date: 24    Time In/Out:    Time In: 1125  Time Out: 1230  PT Visit Info:    Plan Frequency: 1-2x/week x 90 days  Total # of Visits to Date: 12      Visit Count:  12    OUTPATIENT PHYSICAL THERAPY:   Treatment Note 2023       Charge Capture   Episode  (Neck pain)               Treatment Diagnosis:    Cervicalgia  Muscle weakness (generalized)  Medical/Referring Diagnosis:    Chronic neck pain    Referring Physician:  Klaus Pineda MD MD Orders:  PT Eval and Treat   Return MD Appt:  10/31/23   Date of Onset:  Onset Date: 23     Allergies:   Metoclopramide, Penicillins, Clarithromycin, Erythromycin, and Adhesive tape  Restrictions/Precautions:   None      Interventions Planned (Treatment may consist of any combination of the following):     See Assessment Note  Subjective Comments:      Patient reports overall her neck pain and tightness has improved. She states it was bad 1-2x over the last 2 weeks due to increased oh activity. Initial Pain Level[de-identified]   Neck 3/10  Post Session Pain Level:     Neck 2/10  Medications Last Reviewed:  2023  Updated Objective Findings:  None Today  Treatment   THERAPEUTIC EXERCISE: (30 minutes):    Exercises per grid below to improve mobility, strength, and posture . Required moderate visual, verbal, and tactile cues to promote proper body alignment, promote proper body posture, promote proper body mechanics, and promote proper body breathing techniques. Progressed resistance, range, repetitions, and complexity of movement as indicated.   MANUAL THERAPY: (15

## 2023-12-26 ENCOUNTER — HOSPITAL ENCOUNTER (OUTPATIENT)
Dept: PHYSICAL THERAPY | Age: 73
Setting detail: RECURRING SERIES
Discharge: HOME | End: 2023-12-29
Attending: INTERNAL MEDICINE
Payer: MEDICARE

## 2023-12-26 PROCEDURE — 97140 MANUAL THERAPY 1/> REGIONS: CPT

## 2023-12-26 PROCEDURE — 97110 THERAPEUTIC EXERCISES: CPT

## 2023-12-27 NOTE — PROGRESS NOTES
Francesca Bates  : 1950  Primary: Medicare Part A And B (Medicare)  Secondary: [facility]  [address]  [address]  [facility] [address]  Phone: [phone]  Fax: [phone] Plan Frequency: 1-2x/week x 90 days  Plan of Care/Certification Expiration Date: 24        Plan of Care/Certification Expiration Date:  Plan of Care/Certification Expiration Date: 24    Time In/Out:    Time In: 1115  Time Out: 1230  PT Visit Info:    Plan Frequency: 1-2x/week x 90 days  Total # of Visits to Date: 13      Visit Count:  13    OUTPATIENT PHYSICAL THERAPY:   Treatment Note 2023       Charge Capture   Episode  (Neck pain)               Treatment Diagnosis:    Cervicalgia  Muscle weakness (generalized)  Medical/Referring Diagnosis:    Chronic neck pain    Referring Physician:  Yelitza Khan MD MD Orders:  PT Eval and Treat   Return MD Appt:  10/31/23   Date of Onset:  Onset Date: 23     Allergies:   Metoclopramide, Penicillins, Clarithromycin, Erythromycin, and Adhesive tape  Restrictions/Precautions:   None      Interventions Planned (Treatment may consist of any combination of the following):     See Assessment Note  Subjective Comments:   Patient reports improved pain from last week. She states her pain is the worst first thing in the am.   Initial Pain Level:: Right Neck 2/10  Post Session Pain Level:  Right  Neck 1/10  Medications Last Reviewed:  2023  Updated Objective Findings:  None Today  Treatment   THERAPEUTIC EXERCISE: (40 minutes):    Exercises per grid below to improve mobility, strength, and posture .  Required moderate visual, verbal, and tactile cues to promote proper body alignment, promote proper body posture, promote proper body mechanics, and promote proper body breathing techniques.  Progressed resistance, range, repetitions, and complexity of movement as indicated.  MANUAL THERAPY: (0 minutes):   Joint mobilization and Soft tissue

## 2023-12-28 ENCOUNTER — HOSPITAL ENCOUNTER (OUTPATIENT)
Dept: PHYSICAL THERAPY | Age: 73
Setting detail: RECURRING SERIES
Discharge: HOME | End: 2023-12-31
Attending: INTERNAL MEDICINE
Payer: MEDICARE

## 2023-12-28 PROCEDURE — 97110 THERAPEUTIC EXERCISES: CPT

## 2023-12-28 PROCEDURE — 97012 MECHANICAL TRACTION THERAPY: CPT

## 2023-12-28 ASSESSMENT — PAIN DESCRIPTION - PAIN TYPE: TYPE: CHRONIC PAIN

## 2023-12-28 ASSESSMENT — PAIN DESCRIPTION - ORIENTATION: ORIENTATION: RIGHT

## 2023-12-28 ASSESSMENT — PAIN DESCRIPTION - LOCATION: LOCATION: NECK

## 2023-12-28 ASSESSMENT — PAIN SCALES - GENERAL: PAINLEVEL_OUTOF10: 2

## 2023-12-28 NOTE — PROGRESS NOTES
mobilization and Soft tissue mobilization was utilized and necessary because of the patient's restricted joint motion and restricted motion of soft tissue. STM/MFR to B cervical paraspinals, B UT, B levator scap w/ manual traction and SOC release to aide with improving tissue and joint mobility to decrease pain.   MODALITIES: ( 15 minutes ): Moist heat to cervical and B UT region to aide with decreasing pain. (NO CHARGE)       *  Cold Pack Therapy in order to provide analgesia and reduce inflammation and edema.       *  Hot Pack Therapy in order to provide analgesia and relieve muscle spasm.   MECHANICAL TRACTION: (15 minutes):   Traction was used due to the patient's  cervicalgia  in order to relieve pain in or originating from his spine. Intermittent mechanical traction @ 20deg; (17#/10#); 50% speed and (60/20 hold) to aide with improving joint and tissue mobility.    Date:  12.26.23 Date:  12.28.23 Date:  1.2.24   Activity/Exercise      Cervical AROM flex, B rotation      Posterior Shoulder rolls      Shoulder shrugs 2#; 20x 2#; 20x 2#; 20x   Scapular retraction      Cervical retraction sitting      Cervical retraction supine 20x 20x 20x   B shoulder ext ( palms forw/palms back) BTB; 20x each BTB; 20x each BTB; 15x B   B shoulder horiz abd BTB; 20x BTB; 20x BTB; 15x B   B shoulder rows BTB; 20x BTB; 20x BTB; 15x B   B shoulder ER BTB; 20x BTB; 20x  BTB; 15x B   B shoulder horiz scap retraction BTB; 20x BTB; 20x  BTB; 15x B   Open Book      UR [place] flex 20x B     UE [place] scapt 20x B     Stdg shoulder abduction 2#; 20x 2#; 20x 2#; 15x   Stdg shoulder press-ups 2#; 20x 2#; 20x 2#; 15x   Stdg [place] arnold press  1#; 20x 2#; 15x   Stdg horizontal abd  1#; 20x 1#; 15x   Stdg wall angels      UBE Stepper;   L3; 10min Stepper L3; 10min Stepper  L3; 10min   Access Code: [email or web address]  URL: [email or web address]  Date: 11/08/2023  Prepared by: Yary Huerta    Exercises  - Supine Cervical Retraction with Towel

## 2024-01-02 ENCOUNTER — HOSPITAL ENCOUNTER (OUTPATIENT)
Dept: PHYSICAL THERAPY | Age: 74
Setting detail: RECURRING SERIES
Discharge: HOME | End: 2024-01-05
Attending: INTERNAL MEDICINE
Payer: MEDICARE

## 2024-01-02 PROCEDURE — 97012 MECHANICAL TRACTION THERAPY: CPT

## 2024-01-02 PROCEDURE — 97110 THERAPEUTIC EXERCISES: CPT

## 2024-01-02 ASSESSMENT — PAIN SCALES - GENERAL: PAINLEVEL_OUTOF10: 1

## 2024-01-02 NOTE — PROGRESS NOTES
if needed.    >Total Treatment Billable Duration:  45 minutes ( 30 min therex; 15 min traction )  Time In: 1115  Time Out: 1225    Yary Huerta PT         Charge Capture  [employer] [facility]  Appt Desk     Future Appointments   Date Time Provider Department Center   1/9/2024 10:30 AM Yary Huerta, PT [facility]   1/11/2024 10:30 AM Yary Huerta, PT [facility]   1/16/2024 11:15 AM Yary Huerta, PT [facility]   1/18/2024 10:30 AM Yary Huerta, PT [facility]   2/29/2024 12:00 PM Yelitza Khan MD [facility]   5/14/2024 10:30 AM Debra East MD [facility]

## 2024-01-04 ENCOUNTER — HOSPITAL ENCOUNTER (OUTPATIENT)
Dept: PHYSICAL THERAPY | Age: 74
Setting detail: RECURRING SERIES
Discharge: HOME | End: 2024-01-07
Attending: INTERNAL MEDICINE
Payer: MEDICARE

## 2024-01-04 PROCEDURE — 97012 MECHANICAL TRACTION THERAPY: CPT

## 2024-01-04 PROCEDURE — 97110 THERAPEUTIC EXERCISES: CPT

## 2024-01-04 ASSESSMENT — PAIN DESCRIPTION - LOCATION: LOCATION: SHOULDER;NECK;ARM

## 2024-01-04 ASSESSMENT — PAIN DESCRIPTION - PAIN TYPE: TYPE: CHRONIC PAIN

## 2024-01-04 ASSESSMENT — PAIN DESCRIPTION - DESCRIPTORS: DESCRIPTORS: ACHING;TIGHTNESS

## 2024-01-04 ASSESSMENT — PAIN SCALES - GENERAL: PAINLEVEL_OUTOF10: 1

## 2024-01-05 NOTE — PROGRESS NOTES
Francesca Bates  : 1950  Primary: Medicare Part A And B (Medicare)  Secondary: [facility] [place] [facility]  [address]  [address]  [facility] [address]  Phone: [phone]  Fax: [phone] Plan Frequency: 1-2x/week x 90 days  Plan of Care/Certification Expiration Date: 24        Plan of Care/Certification Expiration Date:  Plan of Care/Certification Expiration Date: 24    Time In/Out:    Time In: 1030  Time Out: 1140  PT Visit Info:    Plan Frequency: 1-2x/week x 90 days  Total # of Visits to Date: 16      Visit Count:  16    OUTPATIENT PHYSICAL THERAPY:   Treatment Note 2024       Charge Capture   Episode  (Neck pain)               Treatment Diagnosis:    Cervicalgia  Muscle weakness (generalized)  Medical/Referring Diagnosis:    Chronic neck pain    Referring Physician:  Yelitza Khan MD MD Orders:  PT Eval and Treat   Return MD Appt:  10/31/23   Date of Onset:  Onset Date: 23     Allergies:   Metoclopramide, Penicillins, Clarithromycin, Erythromycin, and Adhesive tape  Restrictions/Precautions:   None      Interventions Planned (Treatment may consist of any combination of the following):     See Assessment Note  Subjective Comments:   Patient c/o more R side neck pain and stiffness today compared to last week. She states she feels it's from the cold, rainy weather.   Initial Pain Level:: Right Neck, Shoulder, Arm 1/10  Post Session Pain Level:  Right  Neck, Shoulder, Arm 0/10  Medications Last Reviewed:  2024  Updated Objective Findings:  None Today  Treatment   THERAPEUTIC EXERCISE: (30 minutes):    Exercises per grid below to improve mobility, strength, and posture .  Required moderate visual, verbal, and tactile cues to promote proper body alignment, promote proper body posture, promote proper body mechanics, and promote proper body breathing techniques.  Progressed resistance, range, repetitions, and complexity of movement as indicated.  MANUAL

## 2024-01-09 ENCOUNTER — HOSPITAL ENCOUNTER (OUTPATIENT)
Dept: PHYSICAL THERAPY | Age: 74
Setting detail: RECURRING SERIES
Discharge: HOME | End: 2024-01-12
Attending: INTERNAL MEDICINE
Payer: MEDICARE

## 2024-01-09 PROCEDURE — 97110 THERAPEUTIC EXERCISES: CPT

## 2024-01-09 PROCEDURE — 97012 MECHANICAL TRACTION THERAPY: CPT

## 2024-01-09 ASSESSMENT — PAIN DESCRIPTION - ORIENTATION: ORIENTATION: RIGHT

## 2024-01-09 ASSESSMENT — PAIN DESCRIPTION - LOCATION: LOCATION: NECK;SHOULDER;ARM

## 2024-01-09 ASSESSMENT — PAIN DESCRIPTION - DESCRIPTORS: DESCRIPTORS: ACHING;TIGHTNESS

## 2024-01-09 ASSESSMENT — PAIN SCALES - GENERAL: PAINLEVEL_OUTOF10: 1

## 2024-01-09 ASSESSMENT — PAIN DESCRIPTION - PAIN TYPE: TYPE: CHRONIC PAIN

## 2024-01-09 NOTE — PROGRESS NOTES
Francesca Bates  : 1950  Primary: Medicare Part A And B (Medicare)  [facility]  [address]  [address]  [facility] [address]  Phone: [phone]  Fax: [phone] Plan Frequency: 1-2x/week x 90 days  Plan of Care/Certification Expiration Date: 24        Plan of Care/Certification Expiration Date:  Plan of Care/Certification Expiration Date: 24    Time In/Out:    Time In: 1030  Time Out: 1140  PT Visit Info:    Plan Frequency: 1-2x/week x 90 days  Total # of Visits to Date: 17      Visit Count:  17    OUTPATIENT PHYSICAL THERAPY:   Treatment Note 2024       Charge Capture   Episode  (Neck pain)               Treatment Diagnosis:    Cervicalgia  Muscle weakness (generalized)  Medical/Referring Diagnosis:    Chronic neck pain    Referring Physician:  Yelitza Khan MD MD Orders:  PT Eval and Treat   Return MD Appt:  10/31/23   Date of Onset:  Onset Date: 23     Allergies:   Metoclopramide, Penicillins, Clarithromycin, Erythromycin, and Adhesive tape  Restrictions/Precautions:   None      Interventions Planned (Treatment may consist of any combination of the following):     See Assessment Note  Subjective Comments:   Patient reports some neck stiffness, but less pain than earlier in the week.   Initial Pain Level:: Right Neck, Arm 1/10  Post Session Pain Level:  Right  Neck, Arm 0/10  Medications Last Reviewed:  2024  Updated Objective Findings:  None Today  Treatment   THERAPEUTIC EXERCISE: (30 minutes):    Exercises per grid below to improve mobility, strength, and posture .  Required moderate visual, verbal, and tactile cues to promote proper body alignment, promote proper body posture, promote proper body mechanics, and promote proper body breathing techniques.  Progressed resistance, range, repetitions, and complexity of movement as indicated.  MANUAL THERAPY: (0 minutes):   Joint mobilization and Soft tissue mobilization was

## 2024-01-11 ENCOUNTER — HOSPITAL ENCOUNTER (OUTPATIENT)
Dept: PHYSICAL THERAPY | Age: 74
Setting detail: RECURRING SERIES
Discharge: HOME | End: 2024-01-14
Attending: INTERNAL MEDICINE
Payer: MEDICARE

## 2024-01-11 PROCEDURE — 97012 MECHANICAL TRACTION THERAPY: CPT

## 2024-01-11 PROCEDURE — 97110 THERAPEUTIC EXERCISES: CPT

## 2024-01-11 ASSESSMENT — PAIN DESCRIPTION - DESCRIPTORS: DESCRIPTORS: ACHING

## 2024-01-11 ASSESSMENT — PAIN DESCRIPTION - LOCATION: LOCATION: NECK;ARM

## 2024-01-11 ASSESSMENT — PAIN DESCRIPTION - ORIENTATION: ORIENTATION: RIGHT

## 2024-01-11 ASSESSMENT — PAIN DESCRIPTION - PAIN TYPE: TYPE: CHRONIC PAIN

## 2024-01-11 ASSESSMENT — PAIN SCALES - GENERAL: PAINLEVEL_OUTOF10: 1

## 2024-01-12 NOTE — PROGRESS NOTES
Francesca Bates  : 1950  Primary: Medicare Part A And B (Medicare)  Secondary: [facility] [place] [facility]  [address]  [address]  [facility] [address]  Phone: [phone]  Fax: [phone] Plan Frequency: 1-2x/week x 90 days  Plan of Care/Certification Expiration Date: 24        Plan of Care/Certification Expiration Date:  Plan of Care/Certification Expiration Date: 24    Time In/Out:    Time In: 1115  Time Out: 1230  PT Visit Info:    Plan Frequency: 1-2x/week x 90 days  Total # of Visits to Date: 18      Visit Count:  18    OUTPATIENT PHYSICAL THERAPY:   Treatment Note 2024       Charge Capture   Episode  (Neck pain)               Treatment Diagnosis:    Cervicalgia  Muscle weakness (generalized)  Medical/Referring Diagnosis:    Chronic neck pain    Referring Physician:  Yelitza Khan MD MD Orders:  PT Eval and Treat   Return MD Appt:  10/31/23   Date of Onset:  Onset Date: 23     Allergies:   Metoclopramide, Penicillins, Clarithromycin, Erythromycin, and Adhesive tape  Restrictions/Precautions:   None      Interventions Planned (Treatment may consist of any combination of the following):     See Assessment Note  Subjective Comments:   Patient reports minor neck stiffness, but overall doing well.   Initial Pain Level:: Right Neck, Arm 1/10  Post Session Pain Level:  Right  Neck, Arm 0/10  Medications Last Reviewed:  2024  Updated Objective Findings:  None Today  Treatment   THERAPEUTIC EXERCISE: (30 minutes):    Exercises per grid below to improve mobility, strength, and posture .  Required moderate visual, verbal, and tactile cues to promote proper body alignment, promote proper body posture, promote proper body mechanics, and promote proper body breathing techniques.  Progressed resistance, range, repetitions, and complexity of movement as indicated.  MANUAL THERAPY: (0 minutes):   Joint mobilization and Soft tissue mobilization was utilized and

## 2024-01-16 ENCOUNTER — HOSPITAL ENCOUNTER (OUTPATIENT)
Dept: PHYSICAL THERAPY | Age: 74
Setting detail: RECURRING SERIES
Discharge: HOME | End: 2024-01-19
Attending: INTERNAL MEDICINE
Payer: MEDICARE

## 2024-01-16 PROCEDURE — 97110 THERAPEUTIC EXERCISES: CPT

## 2024-01-16 PROCEDURE — 97012 MECHANICAL TRACTION THERAPY: CPT

## 2024-01-16 ASSESSMENT — PAIN DESCRIPTION - LOCATION: LOCATION: NECK;ARM

## 2024-01-16 ASSESSMENT — PAIN DESCRIPTION - ORIENTATION: ORIENTATION: RIGHT

## 2024-01-16 ASSESSMENT — PAIN DESCRIPTION - DESCRIPTORS: DESCRIPTORS: ACHING

## 2024-01-16 ASSESSMENT — PAIN SCALES - GENERAL: PAINLEVEL_OUTOF10: 1

## 2024-01-16 ASSESSMENT — PAIN DESCRIPTION - PAIN TYPE: TYPE: CHRONIC PAIN

## 2024-01-17 NOTE — PROGRESS NOTES
Francesca Bates  : 1950  Primary: Medicare Part A And B (Medicare)  [facility]  [address]  [address]  [facility] [address]  Phone: [phone]  Fax: [phone] Plan Frequency: 1-2x/week x 90 days  Plan of Care/Certification Expiration Date: 24        Plan of Care/Certification Expiration Date:  Plan of Care/Certification Expiration Date: 24    Time In/Out:    Time In: 1030  Time Out: 1135  PT Visit Info:    Plan Frequency: 1-2x/week x 90 days  Total # of Visits to Date: 19      Visit Count:  19    OUTPATIENT PHYSICAL THERAPY:   Treatment Note 2024       Charge Capture   Episode  (Neck pain)               Treatment Diagnosis:    Cervicalgia  Muscle weakness (generalized)  Medical/Referring Diagnosis:    Chronic neck pain    Referring Physician:  Yelitza Khan MD MD Orders:  PT Eval and Treat   Return MD Appt:  10/31/23   Date of Onset:  Onset Date: 23     Allergies:   Metoclopramide, Penicillins, Clarithromycin, Erythromycin, and Adhesive tape  Restrictions/Precautions:   None      Interventions Planned (Treatment may consist of any combination of the following):     See Assessment Note  Subjective Comments:   Patient denies an increase in neck or shoulder pain after last PT visit. She reports overall doing well, but still has some neck stiffness.   Initial Pain Level::     2/10  Post Session Pain Level:       1/10  Medications Last Reviewed:  2024  Updated Objective Findings:  None Today  Treatment   THERAPEUTIC EXERCISE: (30 minutes):    Exercises per grid below to improve mobility, strength, and posture .  Required moderate visual, verbal, and tactile cues to promote proper body alignment, promote proper body posture, promote proper body mechanics, and promote proper body breathing techniques.  Progressed resistance, range, repetitions, and complexity of movement as indicated.  MANUAL THERAPY: (0 minutes):   Joint

## 2024-01-17 NOTE — PROGRESS NOTES
Francesca Bates  : 1950  Primary: Medicare Part A And B (Medicare)  [facility]  [address]  [address]  [facility] [address]  Phone: [phone]  Fax: [phone] Plan Frequency: 1-2x/week x 90 days  Plan of Care/Certification Expiration Date: 24        Plan of Care/Certification Expiration Date:  Plan of Care/Certification Expiration Date: 24    Time In/Out:    Time In: 1030  Time Out: 1135  PT Visit Info:    Plan Frequency: 1-2x/week x 90 days  Total # of Visits to Date: 19      Visit Count:  19                OUTPATIENT PHYSICAL THERAPY:             Recertification 2024               Charge Capture   Episode (Neck pain)         Treatment Diagnosis:     Cervicalgia  Muscle weakness (generalized)  Medical/Referring Diagnosis:    Chronic neck pain    Referring Physician:  Yelitza Khan MD MD Orders:  PT Eval and Treat   Return MD Appt:  10/31/23  Date of Onset:  Onset Date: 23     Allergies:  Metoclopramide, Penicillins, Clarithromycin, Erythromycin, and Adhesive tape  Restrictions/Precautions:    None      Medications Last Reviewed:  2024     SUBJECTIVE   History of Injury/Illness (Reason for Referral):  Patient reports she woke up on 23 with neck pain and stiffness that has worsened over the last several months. She reports very guarded with movement and driving, reading and working on her phone have become difficult. She states she takes Advil to help manage her pain.   Patient Stated Goal(s):  \"reduced pain and more flexibility\"  Initial Pain Level:     210   Post Session Pain Level:     110  Past Medical History/Comorbidities:   Ms. Bates  has a past medical history of Depression, Gastroparesis, Hearing loss, Osteopenia, Osteoporosis, Otosclerosis, Pleurisy, Rheumatic fever, Tinnitus, and Unspecified adverse effect of anesthesia.  Ms. Bates  has a past surgical history that includes Cholecystectomy;

## 2024-01-18 ENCOUNTER — HOSPITAL ENCOUNTER (OUTPATIENT)
Dept: PHYSICAL THERAPY | Age: 74
Setting detail: RECURRING SERIES
Discharge: HOME | End: 2024-01-21
Attending: INTERNAL MEDICINE
Payer: MEDICARE

## 2024-01-18 PROCEDURE — 97110 THERAPEUTIC EXERCISES: CPT

## 2024-01-18 PROCEDURE — 97012 MECHANICAL TRACTION THERAPY: CPT

## 2024-01-18 ASSESSMENT — PAIN DESCRIPTION - PAIN TYPE: TYPE: CHRONIC PAIN

## 2024-01-18 ASSESSMENT — PAIN SCALES - GENERAL: PAINLEVEL_OUTOF10: 2

## 2024-01-26 NOTE — PROGRESS NOTES
Single Arm Shoulder Extension with Anchored Resistance  - 2 x daily - 7 x weekly - 1 sets - 10 reps  - Shoulder Extension with Resistance - Palms Forward  - 2 x daily - 7 x weekly - 1 sets - 10 reps  - Standing Shoulder Horizontal Abduction with Anchored Resistance  - 2 x daily - 7 x weekly - 1 sets - 10 reps  - Scapular Retraction with Resistance  - 2 x daily - 7 x weekly - 1 sets - 10 reps  - Shoulder External Rotation and Scapular Retraction with Resistance  - 2 x daily - 7 x weekly - 1 sets - 10 reps  Access Code: [email or web address]  URL: [email or web address]  Date: 10/26/2023  Prepared by: Yary Huerta    Exercises  - Standing Cervical Flexion AROM  - 2 x daily - 7 x weekly - 2 sets - 10 reps  - Standing Cervical Rotation AROM  - 2 x daily - 7 x weekly - 2 sets - 10 reps  - Seated Scapular Retraction  - 2 x daily - 7 x weekly - 2 sets - 10 reps  - Standing Backward Shoulder Rolls  - 2 x daily - 7 x weekly - 2 sets - 10 reps  - Seated Shoulder Shrugs  - 2 x daily - 7 x weekly - 2 sets - 10 reps  - Standing Cervical Retraction  - 2 x daily - 7 x weekly - 2 sets - 10 reps  - Supine Cervical Retraction with Towel  - 2 x daily - 7 x weekly - 2 sets - 10 reps  Treatment/Session Summary:    Treatment Assessment: Patient with improved pain and mobility after manual and intermittent traction today. She tried the static traction unit at home, but felt it was a little aggressive. Patient is progressing nicely toward current goals.   Communication/Consultation:   HEP 2x daily; Bring phone/ipad up to eye level  Equipment provided today:  None  Recommendations/Intent for next treatment session: Next visit will focus on advancements to more challenging postural stretching, strengthening and ROM exercises, and manual therapy if needed.    >Total Treatment Billable Duration:  45 minutes ( 20 min therex; 10 min manual; 15 min traction )  Time In: 1435  Time Out: 1600    Yary Huerta, PT         Charge Capture

## 2024-01-30 ENCOUNTER — HOSPITAL ENCOUNTER (OUTPATIENT)
Dept: PHYSICAL THERAPY | Age: 74
Setting detail: RECURRING SERIES
Discharge: HOME | End: 2024-02-02
Attending: INTERNAL MEDICINE
Payer: MEDICARE

## 2024-01-30 PROCEDURE — 97140 MANUAL THERAPY 1/> REGIONS: CPT

## 2024-01-30 PROCEDURE — 97110 THERAPEUTIC EXERCISES: CPT

## 2024-01-30 PROCEDURE — 97012 MECHANICAL TRACTION THERAPY: CPT

## 2024-01-30 ASSESSMENT — PAIN DESCRIPTION - ORIENTATION: ORIENTATION: RIGHT

## 2024-01-30 ASSESSMENT — PAIN DESCRIPTION - PAIN TYPE: TYPE: CHRONIC PAIN

## 2024-01-30 ASSESSMENT — PAIN DESCRIPTION - DESCRIPTORS: DESCRIPTORS: ACHING;SORE;TIGHTNESS

## 2024-01-30 ASSESSMENT — PAIN SCALES - GENERAL: PAINLEVEL_OUTOF10: 2

## 2024-01-30 ASSESSMENT — PAIN DESCRIPTION - LOCATION: LOCATION: NECK;ARM

## 2024-01-30 NOTE — PROGRESS NOTES
Desk     Future Appointments   Date Time Provider Department Center   2/29/2024 12:00 PM Yelitza Khan MD [facility]   5/14/2024 10:30 AM Debra East MD [facility]

## 2024-02-01 ENCOUNTER — HOSPITAL ENCOUNTER (OUTPATIENT)
Dept: PHYSICAL THERAPY | Age: 74
Setting detail: RECURRING SERIES
Discharge: HOME | End: 2024-02-04
Attending: INTERNAL MEDICINE
Payer: MEDICARE

## 2024-02-01 PROCEDURE — 97110 THERAPEUTIC EXERCISES: CPT

## 2024-02-01 PROCEDURE — 97012 MECHANICAL TRACTION THERAPY: CPT

## 2024-02-01 ASSESSMENT — PAIN SCALES - GENERAL: PAINLEVEL_OUTOF10: 1

## 2024-02-01 ASSESSMENT — PAIN DESCRIPTION - PAIN TYPE: TYPE: CHRONIC PAIN

## 2024-02-01 ASSESSMENT — PAIN DESCRIPTION - LOCATION: LOCATION: NECK;ARM

## 2024-02-01 ASSESSMENT — PAIN DESCRIPTION - ORIENTATION: ORIENTATION: RIGHT

## 2024-02-06 NOTE — PROGRESS NOTES
Francesca Bates  : 1950  Primary: Medicare Part A And B (Medicare)  [facility]  [address]  [address]  [facility] [address]  Phone: [phone]  Fax: [phone] Plan Frequency: 1-2x/week x 90 days  Plan of Care/Certification Expiration Date: 24        Plan of Care/Certification Expiration Date:  Plan of Care/Certification Expiration Date: 24    Time In/Out:    Time In: 1445  Time Out: 1600  PT Visit Info:    Plan Frequency: 1-2x/week x 90 days  Total # of Visits to Date: 22      Visit Count:  22    OUTPATIENT PHYSICAL THERAPY:   Treatment Note 2024       Charge Capture   Episode  (Neck pain)               Treatment Diagnosis:    Cervicalgia  Muscle weakness (generalized)  Medical/Referring Diagnosis:    Chronic neck pain    Referring Physician:  Yelitza Khan MD MD Orders:  PT Eval and Treat   Return MD Appt:  10/31/23   Date of Onset:  Onset Date: 23     Allergies:   Metoclopramide, Penicillins, Clarithromycin, Erythromycin, and Adhesive tape  Restrictions/Precautions:   None      Interventions Planned (Treatment may consist of any combination of the following):     See Assessment Note  Subjective Comments: Patient reports overall she's doing fairly well with minimal neck or arm pain today.  Initial Pain Level:: Right Neck, Arm 1/10  Post Session Pain Level:  Right  Neck, Arm 1/10  Medications Last Reviewed:  2024  Updated Objective Findings:  None Today  Treatment   THERAPEUTIC EXERCISE: (30 minutes):    Exercises per grid below to improve mobility, strength, and posture .  Required moderate visual, verbal, and tactile cues to promote proper body alignment, promote proper body posture, promote proper body mechanics, and promote proper body breathing techniques.  Progressed resistance, range, repetitions, and complexity of movement as indicated.  MANUAL THERAPY: (0 minutes):   Joint mobilization and Soft tissue mobilization 
none

## 2024-02-08 ENCOUNTER — HOSPITAL ENCOUNTER (OUTPATIENT)
Dept: PHYSICAL THERAPY | Age: 74
Setting detail: RECURRING SERIES
Discharge: HOME | End: 2024-02-11
Attending: INTERNAL MEDICINE
Payer: MEDICARE

## 2024-02-08 PROCEDURE — 97110 THERAPEUTIC EXERCISES: CPT

## 2024-02-08 PROCEDURE — 97012 MECHANICAL TRACTION THERAPY: CPT

## 2024-02-09 NOTE — PROGRESS NOTES
Francesca Bates  : 1950  Primary: Medicare Part A And B (Medicare)  Secondary: [facility] [place] [facility]  [address]  [address]  [facility] [address]  Phone: [phone]  Fax: [phone] Plan Frequency: 1-2x/week x 90 days  Plan of Care/Certification Expiration Date: 24        Plan of Care/Certification Expiration Date:  Plan of Care/Certification Expiration Date: 24    Time In/Out:    Time In: 1040  Time Out: 1155  PT Visit Info:    Plan Frequency: 1-2x/week x 90 days  Total # of Visits to Date: 23      Visit Count:  23    OUTPATIENT PHYSICAL THERAPY:   Treatment Note 2024       Charge Capture   Episode  (Neck pain)               Treatment Diagnosis:    Cervicalgia  Muscle weakness (generalized)  Medical/Referring Diagnosis:    Chronic neck pain    Referring Physician:  Yelitza Khan MD MD Orders:  PT Eval and Treat   Return MD Appt:  10/31/23   Date of Onset:  Onset Date: 23     Allergies:   Metoclopramide, Penicillins, Clarithromycin, Erythromycin, and Adhesive tape  Restrictions/Precautions:   None      Interventions Planned (Treatment may consist of any combination of the following):     See Assessment Note  Subjective Comments: Patient reports she's doing very well today, and for the last several days.   Initial Pain Level:: Right Neck, Arm 0/10  Post Session Pain Level:  Right  Neck, Arm 0/10  Medications Last Reviewed:  2024  Updated Objective Findings:  None Today  Treatment   THERAPEUTIC EXERCISE: (40 minutes):    Exercises per grid below to improve mobility, strength, and posture .  Required moderate visual, verbal, and tactile cues to promote proper body alignment, promote proper body posture, promote proper body mechanics, and promote proper body breathing techniques.  Progressed resistance, range, repetitions, and complexity of movement as indicated.  MANUAL THERAPY: (0 minutes):   Joint mobilization and Soft tissue mobilization was

## 2024-02-13 ENCOUNTER — HOSPITAL ENCOUNTER (OUTPATIENT)
Dept: PHYSICAL THERAPY | Age: 74
Setting detail: RECURRING SERIES
Discharge: HOME | End: 2024-02-16
Attending: INTERNAL MEDICINE
Payer: MEDICARE

## 2024-02-13 PROCEDURE — 97110 THERAPEUTIC EXERCISES: CPT

## 2024-02-13 PROCEDURE — 97012 MECHANICAL TRACTION THERAPY: CPT

## 2024-02-15 ENCOUNTER — HOSPITAL ENCOUNTER (OUTPATIENT)
Dept: PHYSICAL THERAPY | Age: 74
Setting detail: RECURRING SERIES
Discharge: HOME | End: 2024-02-18
Attending: INTERNAL MEDICINE
Payer: MEDICARE

## 2024-02-15 DIAGNOSIS — R26.2 DIFFICULTY IN WALKING, NOT ELSEWHERE CLASSIFIED: ICD-10-CM

## 2024-02-15 DIAGNOSIS — M54.51 VERTEBROGENIC LOW BACK PAIN: Primary | ICD-10-CM

## 2024-02-15 DIAGNOSIS — M54.2 CERVICALGIA: ICD-10-CM

## 2024-02-15 DIAGNOSIS — M62.81 MUSCLE WEAKNESS (GENERALIZED): ICD-10-CM

## 2024-02-15 PROCEDURE — 97110 THERAPEUTIC EXERCISES: CPT

## 2024-02-15 PROCEDURE — 97012 MECHANICAL TRACTION THERAPY: CPT

## 2024-02-15 PROCEDURE — 97140 MANUAL THERAPY 1/> REGIONS: CPT

## 2024-02-15 ASSESSMENT — PAIN DESCRIPTION - LOCATION: LOCATION: BACK;HIP;LEG

## 2024-02-15 ASSESSMENT — PAIN DESCRIPTION - PAIN TYPE: TYPE: CHRONIC PAIN

## 2024-02-15 ASSESSMENT — PAIN DESCRIPTION - ORIENTATION: ORIENTATION: RIGHT

## 2024-02-15 ASSESSMENT — PAIN SCALES - GENERAL: PAINLEVEL_OUTOF10: 4

## 2024-02-15 NOTE — PROGRESS NOTES
I am accessing Ms. Bates's chart as a part of our department's internal chart auditing process.  I certify that Ms. Bates is, or was, a patient in our department.  Thank you,  NIKKI MATIAS, PT  2/15/2024    
and promote proper body breathing techniques.  Progressed resistance, range, repetitions, and complexity of movement as indicated.  MANUAL THERAPY: (10 minutes):   Joint mobilization and Soft tissue mobilization was utilized and necessary because of the patient's restricted joint motion and restricted motion of soft tissue. STM/MFR to B cervical paraspinals, B UT, B levator scap w/ manual traction and SOC release to aide with improving tissue and joint mobility to decrease pain (not today). STM/MFR to posterior/lateral R hip w/elbow and stick to aide with improving tissue mobility and decreasing pain.   MODALITIES: ( 15 minutes ): Moist heat to cervical and B UT region to aide with decreasing pain. (NO CHARGE)       *  Cold Pack Therapy in order to provide analgesia and reduce inflammation and edema.       *  Hot Pack Therapy in order to provide analgesia and relieve muscle spasm.   MECHANICAL TRACTION: (15 minutes):   Traction was used due to the patient's  cervicalgia  in order to relieve pain in or originating from his spine. Intermittent mechanical traction @ 20deg; (19#/11#); 50% speed and (60/20 hold) to aide with improving joint and tissue mobility.    Date:  2.8.24 Date:  2.13.24 Date:  2.15.24   Activity/Exercise      Cervical AROM flex, B rotation      Posterior Shoulder rolls 3#; 20x 3#; 15x    Shoulder shrugs 3#; 20x 3#; 15x    Scapular retraction      Cervical retraction sitting      Cervical retraction supine 20x 20x    B shoulder ext ( palms forw/palms back) GTB; 20x B GTB; 20x B each direction    B shoulder horiz abd GTB; 20x B GTB; 20x    B shoulder rows GTB; 20x B GTB; 20x    B shoulder ER GTB; 20x B GTB; 20x    B shoulder horiz scap retraction GTB; 20x B GTB;20x    B Tree hugger  GTB; 20x    Open Book      UR [place] flex      UE [place] scapt      Stdg shoulder abduction 3#; 10x 3#; 15x    Stdg shoulder press-ups 3#; 10x 3#; 15x    Stdg [place] arnold press 3#; 10x 2#; 15x    Stdg horizontal abd 2#; 15x

## 2024-02-16 NOTE — PROGRESS NOTES
Francesca Bates  : 1950  Primary: Medicare Part A And B (Medicare)  [facility]: [facility]  [address]  [address]  [facility] [address]  Phone: [phone]  Fax: [phone] Plan Frequency: 1-2x/week x 90 days  Plan of Care/Certification Expiration Date: 24        Plan of Care/Certification Expiration Date:  Plan of Care/Certification Expiration Date: 24    Time In/Out:    Time In: 1040  Time Out: 1200  PT Visit Info:    Plan Frequency: 1-2x/week x 90 days  Total # of Visits to Date: 25      Visit Count:  25    OUTPATIENT PHYSICAL THERAPY:   Treatment Note 2024       Charge Capture   Episode  (Neck pain)               Treatment Diagnosis:    Cervicalgia  Muscle weakness (generalized)  LBP  Walking difficulty  Medical/Referring Diagnosis:    Chronic neck pain    Referring Physician:  Yelitza Khan MD MD Orders:  PT Eval and Treat   Return MD Appt:  10/31/23   Date of Onset:  Onset Date: 23     Allergies:   Metoclopramide, Penicillins, Clarithromycin, Erythromycin, and Adhesive tape  Restrictions/Precautions:   None      Interventions Planned (Treatment may consist of any combination of the following):     See Assessment Note  Subjective Comments: Patient reports neck is still feeling good and she's compliant with her HEP. She reports much improved R hip/leg pain, but it's still present.   Initial Pain Level:: Right Arm, Hip, Leg 3/10  Post Session Pain Level:  Right  Arm, Hip, Leg 1/10  Medications Last Reviewed:  2024  Updated Objective Findings:   R anterior innominate rotation and L side lumbar rotational fault  Treatment   THERAPEUTIC EXERCISE: (30 minutes):    Exercises per grid below to improve mobility, strength, and posture .  Required moderate visual, verbal, and tactile cues to promote proper body alignment, promote proper body posture, promote proper body mechanics, and promote proper body breathing techniques.  Progressed

## 2024-02-20 ENCOUNTER — HOSPITAL ENCOUNTER (OUTPATIENT)
Dept: PHYSICAL THERAPY | Age: 74
Setting detail: RECURRING SERIES
Discharge: HOME | End: 2024-02-23
Attending: INTERNAL MEDICINE
Payer: MEDICARE

## 2024-02-20 PROCEDURE — 97110 THERAPEUTIC EXERCISES: CPT

## 2024-02-20 PROCEDURE — 97012 MECHANICAL TRACTION THERAPY: CPT

## 2024-02-20 PROCEDURE — 97140 MANUAL THERAPY 1/> REGIONS: CPT

## 2024-02-20 ASSESSMENT — PAIN DESCRIPTION - ORIENTATION: ORIENTATION: RIGHT

## 2024-02-20 ASSESSMENT — PAIN SCALES - GENERAL: PAINLEVEL_OUTOF10: 3

## 2024-02-20 ASSESSMENT — PAIN DESCRIPTION - LOCATION: LOCATION: ARM;HIP;LEG

## 2024-02-20 ASSESSMENT — PAIN DESCRIPTION - PAIN TYPE: TYPE: CHRONIC PAIN

## 2024-02-20 ASSESSMENT — PAIN DESCRIPTION - DESCRIPTORS: DESCRIPTORS: ACHING;TIGHTNESS;THROBBING

## 2024-02-21 NOTE — PROGRESS NOTES
reps  - Standing Cervical Rotation AROM  - 2 x daily - 7 x weekly - 2 sets - 10 reps  - Seated Scapular Retraction  - 2 x daily - 7 x weekly - 2 sets - 10 reps  - Standing Backward Shoulder Rolls  - 2 x daily - 7 x weekly - 2 sets - 10 reps  - Seated Shoulder Shrugs  - 2 x daily - 7 x weekly - 2 sets - 10 reps  - Standing Cervical Retraction  - 2 x daily - 7 x weekly - 2 sets - 10 reps  - Supine Cervical Retraction with Towel  - 2 x daily - 7 x weekly - 2 sets - 10 reps  Treatment/Session Summary:    Treatment Assessment: Neutral pelvic and lumbar alignment achieved with MET. She was able to tolerate additional manual therapy today. Improved posture and gait quality after treatment. She was given above core and B hip strengthening to add to her HEP today.     Communication/Consultation:   HEP 2x daily; Bring phone/ipad up to eye level  Equipment provided today:  HEP w/instruction sheet; L3 and L4 TB  Recommendations/Intent for next treatment session: Next visit will focus on advancements to more challenging postural stretching, strengthening and ROM exercises, and manual therapy if needed.    >Total Treatment Billable Duration:  60 minutes ( 30 min therex; 15 min manual; 15 min traction)  Time In: 1345  Time Out: 1540    Yary Huerta PT         Charge Capture  [employer] Portal  Appt Desk     Future Appointments   Date Time Provider Department Center   2/27/2024  1:45 PM Yary Huerta PT [facility]   2/29/2024 12:00 PM Yelitza Khan MD [facility]   3/5/2024  1:00 PM Yary Huerta PT [facility]   3/12/2024  1:00 PM Yary Huerta PT [facility]   5/14/2024 10:30 AM Debra East MD [facility]   5/23/2024 10:30 AM Tabby Toro APRN - [facility]

## 2024-02-22 ENCOUNTER — HOSPITAL ENCOUNTER (OUTPATIENT)
Dept: PHYSICAL THERAPY | Age: 74
Setting detail: RECURRING SERIES
Discharge: HOME | End: 2024-02-25
Attending: INTERNAL MEDICINE
Payer: MEDICARE

## 2024-02-22 PROCEDURE — 97012 MECHANICAL TRACTION THERAPY: CPT

## 2024-02-22 PROCEDURE — 97140 MANUAL THERAPY 1/> REGIONS: CPT

## 2024-02-22 PROCEDURE — 97110 THERAPEUTIC EXERCISES: CPT

## 2024-02-22 ASSESSMENT — PAIN DESCRIPTION - DESCRIPTORS: DESCRIPTORS: ACHING;SORE;TIGHTNESS

## 2024-02-22 ASSESSMENT — PAIN DESCRIPTION - ORIENTATION: ORIENTATION: RIGHT

## 2024-02-22 ASSESSMENT — PAIN SCALES - GENERAL: PAINLEVEL_OUTOF10: 4

## 2024-02-22 ASSESSMENT — PAIN DESCRIPTION - PAIN TYPE: TYPE: CHRONIC PAIN

## 2024-02-22 ASSESSMENT — PAIN DESCRIPTION - LOCATION: LOCATION: LEG;HIP;BACK

## 2024-02-22 NOTE — PROGRESS NOTES
Francesca Bates  : 1950  Primary: Medicare Part A And B (Medicare)  Secondary: [facility]  [address]  [address]  [facility] [address]  Phone: [phone]  Fax: [phone] Plan Frequency: 1-2x/week x 90 days  Plan of Care/Certification Expiration Date: 24        Plan of Care/Certification Expiration Date:  Plan of Care/Certification Expiration Date: 24    Time In/Out:    Time In: 1345  Time Out: 1500  PT Visit Info:    Plan Frequency: 1-2x/week x 90 days  Total # of Visits to Date: 27      Visit Count:  27    OUTPATIENT PHYSICAL THERAPY:   Treatment Note 2024       Charge Capture   Episode  (Neck pain)               Treatment Diagnosis:    Cervicalgia  Muscle weakness (generalized)  LBP  Walking difficulty  Medical/Referring Diagnosis:    Chronic neck pain    Referring Physician:  Yelitza Khan MD MD Orders:  PT Eval and Treat   Return MD Appt:  10/31/23   Date of Onset:  Onset Date: 23     Allergies:   Metoclopramide, Penicillins, Clarithromycin, Erythromycin, and Adhesive tape  Restrictions/Precautions:   None      Interventions Planned (Treatment may consist of any combination of the following):     See Assessment Note  Subjective Comments: Patient reports neck is still feeling good. She still c/o R side buttock and hip pain.   Initial Pain Level::   Leg 3/10  Post Session Pain Level:     Leg 1/10  Medications Last Reviewed:  2024  Updated Objective Findings:   L anterior innominate rotation and R side lumbar rotational fault  Treatment   THERAPEUTIC EXERCISE: (25 minutes):    Exercises per grid below to improve mobility, strength, and posture .  Required moderate visual, verbal, and tactile cues to promote proper body alignment, promote proper body posture, promote proper body mechanics, and promote proper body breathing techniques.  Progressed resistance, range, repetitions, and complexity of movement as indicated.  MANUAL

## 2024-02-27 ENCOUNTER — HOSPITAL ENCOUNTER (OUTPATIENT)
Dept: PHYSICAL THERAPY | Age: 74
Setting detail: RECURRING SERIES
Discharge: HOME | End: 2024-03-01
Attending: INTERNAL MEDICINE
Payer: MEDICARE

## 2024-02-27 PROCEDURE — 97110 THERAPEUTIC EXERCISES: CPT

## 2024-02-27 PROCEDURE — 97012 MECHANICAL TRACTION THERAPY: CPT

## 2024-02-27 ASSESSMENT — PAIN SCALES - GENERAL: PAINLEVEL_OUTOF10: 3

## 2024-02-27 ASSESSMENT — PAIN DESCRIPTION - LOCATION: LOCATION: LEG

## 2024-02-27 ASSESSMENT — PAIN DESCRIPTION - PAIN TYPE: TYPE: CHRONIC PAIN

## 2024-02-28 SDOH — HEALTH STABILITY: PHYSICAL HEALTH: ON AVERAGE, HOW MANY MINUTES DO YOU ENGAGE IN EXERCISE AT THIS LEVEL?: 60 MIN

## 2024-02-28 SDOH — HEALTH STABILITY: PHYSICAL HEALTH: ON AVERAGE, HOW MANY DAYS PER WEEK DO YOU ENGAGE IN MODERATE TO STRENUOUS EXERCISE (LIKE A BRISK WALK)?: 5 DAYS

## 2024-02-28 ASSESSMENT — LIFESTYLE VARIABLES
HOW MANY STANDARD DRINKS CONTAINING ALCOHOL DO YOU HAVE ON A TYPICAL DAY: PATIENT DOES NOT DRINK
HOW OFTEN DO YOU HAVE SIX OR MORE DRINKS ON ONE OCCASION: 1
HOW MANY STANDARD DRINKS CONTAINING ALCOHOL DO YOU HAVE ON A TYPICAL DAY: 0
HOW OFTEN DO YOU HAVE A DRINK CONTAINING ALCOHOL: 1
HOW OFTEN DO YOU HAVE A DRINK CONTAINING ALCOHOL: NEVER

## 2024-02-28 ASSESSMENT — PATIENT HEALTH QUESTIONNAIRE - PHQ9
2. FEELING DOWN, DEPRESSED OR HOPELESS: 1
SUM OF ALL RESPONSES TO PHQ9 QUESTIONS 1 & 2: 1
1. LITTLE INTEREST OR PLEASURE IN DOING THINGS: 0
SUM OF ALL RESPONSES TO PHQ QUESTIONS 1-9: 1

## 2024-02-29 ENCOUNTER — OFFICE VISIT (OUTPATIENT)
Dept: INTERNAL MEDICINE CLINIC | Facility: CLINIC | Age: 74
End: 2024-02-29

## 2024-02-29 VITALS
BODY MASS INDEX: 18.43 KG/M2 | DIASTOLIC BLOOD PRESSURE: 62 MMHG | WEIGHT: 104 LBS | HEART RATE: 87 BPM | SYSTOLIC BLOOD PRESSURE: 108 MMHG | HEIGHT: 63 IN | OXYGEN SATURATION: 97 %

## 2024-02-29 DIAGNOSIS — Z00.00 MEDICARE ANNUAL WELLNESS VISIT, SUBSEQUENT: Primary | ICD-10-CM

## 2024-02-29 DIAGNOSIS — Z71.89 ADVANCE CARE PLANNING: ICD-10-CM

## 2024-02-29 DIAGNOSIS — Z23 NEED FOR DIPHTHERIA-TETANUS-PERTUSSIS (TDAP) VACCINE: ICD-10-CM

## 2024-02-29 ASSESSMENT — PATIENT HEALTH QUESTIONNAIRE - PHQ9
SUM OF ALL RESPONSES TO PHQ QUESTIONS 1-9: 0
8. MOVING OR SPEAKING SO SLOWLY THAT OTHER PEOPLE COULD HAVE NOTICED. OR THE OPPOSITE, BEING SO FIGETY OR RESTLESS THAT YOU HAVE BEEN MOVING AROUND A LOT MORE THAN USUAL: 0
7. TROUBLE CONCENTRATING ON THINGS, SUCH AS READING THE NEWSPAPER OR WATCHING TELEVISION: 0
9. THOUGHTS THAT YOU WOULD BE BETTER OFF DEAD, OR OF HURTING YOURSELF: 0
6. FEELING BAD ABOUT YOURSELF - OR THAT YOU ARE A FAILURE OR HAVE LET YOURSELF OR YOUR FAMILY DOWN: 0
3. TROUBLE FALLING OR STAYING ASLEEP: 0
SUM OF ALL RESPONSES TO PHQ QUESTIONS 1-9: 0
4. FEELING TIRED OR HAVING LITTLE ENERGY: 0
5. POOR APPETITE OR OVEREATING: 0
SUM OF ALL RESPONSES TO PHQ QUESTIONS 1-9: 0
10. IF YOU CHECKED OFF ANY PROBLEMS, HOW DIFFICULT HAVE THESE PROBLEMS MADE IT FOR YOU TO DO YOUR WORK, TAKE CARE OF THINGS AT HOME, OR GET ALONG WITH OTHER PEOPLE: 0
SUM OF ALL RESPONSES TO PHQ QUESTIONS 1-9: 0

## 2024-02-29 ASSESSMENT — ENCOUNTER SYMPTOMS
ABDOMINAL PAIN: 0
COUGH: 0
SHORTNESS OF BREATH: 0

## 2024-02-29 NOTE — ACP (ADVANCE CARE PLANNING)
Advance Care Planning     Advance Care Planning (ACP) Physician/NP/PA Conversation    Date of Conversation: 2/29/2024  Conducted with: Patient with Decision Making Capacity    Healthcare Decision Maker: Patient would like to attempt resuscitation, but would not like to be kept alive on machines if patient is not going to be able to recover.      Primary Decision Maker: Maggie Joshi - Lady - [phone]    Click here to complete Healthcare Decision Makers including selection of the Healthcare Decision Maker Relationship (ie \"Primary\")  Today we discussed it and patient will bring HCPOA to office    Care Preferences:    Hospitalization:  \"If your health worsens and it becomes clear that your chance of recovery is unlikely, what would be your preference regarding hospitalization?\"  The patient would prefer hospitalization.    Ventilation:  \"If you were unable to breath on your own and your chance of recovery was unlikely, what would be your preference about the use of a ventilator (breathing machine) if it was available to you?\"  The patient would desire the use of a ventilator.    Resuscitation:  \"In the event your heart stopped as a result of an underlying serious health condition, would you want attempts made to restart your heart, or would you prefer a natural death?\"  Yes, attempt to resuscitate.    benefit/burden of treatment options, artificial nutrition, ventilation preferences, hospitalization preferences, resuscitation preferences, end of life care preferences (vegetative state/imminent death), and hospice care    Conversation Outcomes / Follow-Up Plan:  ACP complete - no further action today  Reviewed DNR/DNI and patient elects Full Code (Attempt Resuscitation)    Length of Voluntary ACP Conversation in minutes:  16 minutes    Yelitza Khan MD

## 2024-02-29 NOTE — PROGRESS NOTES
I am accessing Ms. Bates's chart as a part of our department's internal chart auditing process.  I certify that Ms. Bates is, or was, a patient in our department.  Thank you,  Willian Pimentel, PT  2/29/2024

## 2024-02-29 NOTE — PROGRESS NOTES
SUBJECTIVE:   Francesca Bates is a 73 y.o. female seen for a visit regarding   Chief Complaint   Patient presents with    Medicare AWV        HPI  Anxiety with depression - on Paxil 30mg  Saw a Dermatologist  Chronic neck pain, Right hip pain - seeing PT with help  Osteoporosis, Vitamin d  Deficiency - on Reclast, on Vitamin D supplement, saw Rheumatology  Eustachian tube dysfunction, hearing loss, Otosclerosis - Sees ENT Dr. Ady Welch ENT, has hearing aids  H/o gastroparesis - Zofran as needed, had ticks on reglan, seeing GI Dr. Yañez - advised to call them for colonoscopy appt. - reminded today 2/29/24(Patient has the phone number to call)  Does not want to get Shingles vaccine at this time  Fatty liver mild on Ultrasound  Ultrasound renal has a possible abnormality - possible angiomyolipoma, no abdominal pain, CT abdomen pending still - given number to call radiology again today 2/29/24    Past Medical History, Past Surgical History, Family history, Social History, and Medications were all reviewed with the patient today and updated as necessary.       Current Outpatient Medications   Medication Sig Dispense Refill    Tetanus-Diphth-Acell Pertussis (BOOSTRIX) 5-2.5-18.5 LF-MCG/0.5 injection Inject 0.5 mLs into the muscle once for 1 dose 0.5 mL 0    PARoxetine (PAXIL) 30 MG tablet Take 1 tablet by mouth daily 90 tablet 3    NONFORMULARY Take 10 mg by mouth in the morning and at bedtime DomperidoneDomperidone      triamcinolone acetonide (KENALOG) 10 MG/ML injection Inject 1 mL into the articular space once EVERY 6 MO      calcium carbonate-vitamin D (CALTRATE) 600-400 MG-UNIT TABS per tab Take 1 tablet by mouth 2 times daily      loratadine (CLARITIN) 10 MG tablet Take 1 tablet by mouth daily      zoledronic acid (RECLAST) 5 MG/100ML SOLN Infuse 100 mLs intravenously once       No current facility-administered medications for this visit.     Allergies   Allergen Reactions    Metoclopramide Other (See

## 2024-02-29 NOTE — PATIENT INSTRUCTIONS
factors increase the risk of heart disease.  Your doctor has found that you have a chance of having heart disease. You can do lots of things to keep your heart healthy. It may not be easy, but you can change your diet, exercise more, and quit smoking. These steps really work to lower your chance of heart disease.  Follow-up care is a key part of your treatment and safety. Be sure to make and go to all appointments, and call your doctor if you are having problems. It's also a good idea to know your test results and keep a list of the medicines you take.  How can you care for yourself at home?  Diet    Use less salt when you cook and eat. This helps lower your blood pressure. Taste food before salting. Add only a little salt when you think you need it. With time, your taste buds will adjust to less salt.     Eat fewer snack items, fast foods, canned soups, and other high-salt, high-fat, processed foods.     Read food labels and try to avoid saturated and trans fats. They increase your risk of heart disease by raising cholesterol levels.     Limit the amount of solid fat-butter, margarine, and shortening-you eat. Use olive, peanut, or canola oil when you cook. Bake, broil, and steam foods instead of frying them.     Eat a variety of fruit and vegetables every day. Dark green, deep orange, red, or yellow fruits and vegetables are especially good for you. Examples include spinach, carrots, peaches, and berries.     Foods high in fiber can reduce your cholesterol and provide important vitamins and minerals. High-fiber foods include whole-grain cereals and breads, oatmeal, beans, brown rice, citrus fruits, and apples.     Eat lean proteins. Heart-healthy proteins include seafood, lean meats and poultry, eggs, beans, peas, nuts, seeds, and soy products.     Limit drinks and foods with added sugar. These include candy, desserts, and soda pop.   Lifestyle changes    If your doctor recommends it, get more exercise. Walking is

## 2024-03-05 NOTE — PROGRESS NOTES
Francesca Bates  : 1950  Primary: Medicare Part A And B (Medicare)  [facility]  [address]  [address]  [facility] [address]  Phone: [phone]  Fax: [phone] Plan Frequency: 1-2x/week x 90 days  Plan of Care/Certification Expiration Date: 24        Plan of Care/Certification Expiration Date:  Plan of Care/Certification Expiration Date: 24    Time In/Out:    Time In: 05  Time Out: 1040  PT Visit Info:    Plan Frequency: 1-2x/week x 90 days  Total # of Visits to Date: 28      Visit Count:  28    OUTPATIENT PHYSICAL THERAPY:   Treatment Note 3/7/2024       Charge Capture   Episode  (Neck pain)               Treatment Diagnosis:    Cervicalgia  Muscle weakness (generalized)  LBP  Walking difficulty  Medical/Referring Diagnosis:    Chronic neck pain    Referring Physician:  Yelitza Khan MD MD Orders:  PT Eval and Treat   Return MD Appt:  10/31/23   Date of Onset:  Onset Date: 23     Allergies:   Metoclopramide, Penicillins, Clarithromycin, Erythromycin, and Adhesive tape  Restrictions/Precautions:   None      Interventions Planned (Treatment may consist of any combination of the following):     See Assessment Note  Subjective Comments: Patient reports neck continues to do well, but she woke up with more R hip pain today.   Initial Pain Level:: Right Hip, Neck 6/10  Post Session Pain Level:  Right  Hip, Neck 2/10  Medications Last Reviewed:  3/7/2024  Updated Objective Findings:   R anterior innominate rotation and R side lumbar rotational fault  Treatment   THERAPEUTIC EXERCISE: (30 minutes):    Exercises per grid below to improve mobility, strength, and posture .  Required moderate visual, verbal, and tactile cues to promote proper body alignment, promote proper body posture, promote proper body mechanics, and promote proper body breathing techniques.  Progressed resistance, range, repetitions, and complexity of movement as

## 2024-03-07 ENCOUNTER — HOSPITAL ENCOUNTER (OUTPATIENT)
Dept: PHYSICAL THERAPY | Age: 74
Setting detail: RECURRING SERIES
Discharge: HOME | End: 2024-03-10
Attending: INTERNAL MEDICINE
Payer: MEDICARE

## 2024-03-07 PROCEDURE — 97140 MANUAL THERAPY 1/> REGIONS: CPT

## 2024-03-07 PROCEDURE — 97110 THERAPEUTIC EXERCISES: CPT

## 2024-03-07 PROCEDURE — 97012 MECHANICAL TRACTION THERAPY: CPT

## 2024-03-07 ASSESSMENT — PAIN DESCRIPTION - ORIENTATION: ORIENTATION: RIGHT

## 2024-03-07 ASSESSMENT — PAIN SCALES - GENERAL: PAINLEVEL_OUTOF10: 6

## 2024-03-07 ASSESSMENT — PAIN DESCRIPTION - DESCRIPTORS: DESCRIPTORS: ACHING;TIGHTNESS;SHARP

## 2024-03-07 ASSESSMENT — PAIN DESCRIPTION - LOCATION: LOCATION: HIP;NECK

## 2024-03-07 ASSESSMENT — PAIN DESCRIPTION - PAIN TYPE: TYPE: CHRONIC PAIN

## 2024-03-07 NOTE — PROGRESS NOTES
Francesca Bates  : 1950  Primary: Medicare Part A And B (Medicare)  [facility]  [address]  [address]  [facility] [address]  Phone: [phone]  Fax: [phone] Plan Frequency: 1-2x/week x 90 days  Plan of Care/Certification Expiration Date: 24        Plan of Care/Certification Expiration Date:  Plan of Care/Certification Expiration Date: 24    Time In/Out:    Time In: 1300  Time Out: 1420  PT Visit Info:    Plan Frequency: 1-2x/week x 90 days  Total # of Visits to Date: 29      Visit Count:  29    OUTPATIENT PHYSICAL THERAPY:   Treatment Note 3/12/2024       Charge Capture   Episode  (Neck pain)               Treatment Diagnosis:    Cervicalgia  Muscle weakness (generalized)  LBP  Walking difficulty  Medical/Referring Diagnosis:    Chronic neck pain    Referring Physician:  Yelitza Khan MD MD Orders:  PT Eval and Treat   Return MD Appt:  10/31/23   Date of Onset:  Onset Date: 23     Allergies:   Metoclopramide, Penicillins, Clarithromycin, Erythromycin, and Adhesive tape  Restrictions/Precautions:   None      Interventions Planned (Treatment may consist of any combination of the following):     See Assessment Note  Subjective Comments: Patient reports overall neck is doing well, and R hip is improved from last week, but she can still feel it.   Initial Pain Level:: Right Hip, Neck 2/10  Post Session Pain Level:  Right  Hip, Neck 2/10  Medications Last Reviewed:  3/12/2024  Updated Objective Findings:   L anterior innominate rotation and R side lumbar rotational fault ; True mild L leg discrepancy; lift given for R  Treatment   THERAPEUTIC EXERCISE: (30 minutes):    Exercises per grid below to improve mobility, strength, and posture .  Required moderate visual, verbal, and tactile cues to promote proper body alignment, promote proper body posture, promote proper body mechanics, and promote proper body breathing techniques.

## 2024-03-12 ENCOUNTER — HOSPITAL ENCOUNTER (OUTPATIENT)
Dept: PHYSICAL THERAPY | Age: 74
Setting detail: RECURRING SERIES
Discharge: HOME | End: 2024-03-15
Attending: INTERNAL MEDICINE
Payer: MEDICARE

## 2024-03-12 PROCEDURE — 97012 MECHANICAL TRACTION THERAPY: CPT

## 2024-03-12 PROCEDURE — 97110 THERAPEUTIC EXERCISES: CPT

## 2024-03-12 PROCEDURE — 97140 MANUAL THERAPY 1/> REGIONS: CPT

## 2024-03-12 ASSESSMENT — PAIN DESCRIPTION - DESCRIPTORS: DESCRIPTORS: ACHING;TIGHTNESS;SHARP

## 2024-03-12 ASSESSMENT — PAIN DESCRIPTION - PAIN TYPE: TYPE: CHRONIC PAIN

## 2024-03-12 ASSESSMENT — PAIN DESCRIPTION - LOCATION: LOCATION: HIP;NECK

## 2024-03-12 ASSESSMENT — PAIN DESCRIPTION - ORIENTATION: ORIENTATION: RIGHT

## 2024-03-12 ASSESSMENT — PAIN SCALES - GENERAL: PAINLEVEL_OUTOF10: 2

## 2024-03-21 ENCOUNTER — HOSPITAL ENCOUNTER (OUTPATIENT)
Dept: PHYSICAL THERAPY | Age: 74
Setting detail: RECURRING SERIES
Discharge: HOME | End: 2024-03-24
Attending: INTERNAL MEDICINE
Payer: MEDICARE

## 2024-03-21 PROCEDURE — 97110 THERAPEUTIC EXERCISES: CPT

## 2024-03-21 PROCEDURE — 97012 MECHANICAL TRACTION THERAPY: CPT

## 2024-03-21 PROCEDURE — 97140 MANUAL THERAPY 1/> REGIONS: CPT

## 2024-03-21 ASSESSMENT — PAIN DESCRIPTION - LOCATION: LOCATION: HIP;NECK

## 2024-03-21 ASSESSMENT — PAIN SCALES - GENERAL: PAINLEVEL_OUTOF10: 2

## 2024-03-21 ASSESSMENT — PAIN DESCRIPTION - PAIN TYPE: TYPE: CHRONIC PAIN

## 2024-03-21 NOTE — PROGRESS NOTES
Francesca Bates  : 1950  Primary: Medicare Part A And B (Medicare)  [facility]  [address]  [address]  [facility] [address]  Phone: [phone]  Fax: [phone] Plan Frequency: 1-2x/week x 90 days  Plan of Care/Certification Expiration Date: 24        Plan of Care/Certification Expiration Date:  Plan of Care/Certification Expiration Date: 24    Time In/Out:    Time In: 15  Time Out: 0945  PT Visit Info:    Plan Frequency: 1-2x/week x 90 days  Total # of Visits to Date: 30      Visit Count:  30                OUTPATIENT PHYSICAL THERAPY:             Progress Report 3/21/2024               Charge Capture   Episode (Neck pain)         Treatment Diagnosis:     Cervicalgia  Muscle weakness (generalized)  Medical/Referring Diagnosis:    Chronic neck pain    Referring Physician:  Yelitza Khan MD MD Orders:  PT Eval and Treat   Return MD Appt:  10/31/23  Date of Onset:  Onset Date: 23     Allergies:  Metoclopramide, Penicillins, Clarithromycin, Erythromycin, and Adhesive tape  Restrictions/Precautions:    None      Medications Last Reviewed:  3/21/2024     SUBJECTIVE   History of Injury/Illness (Reason for Referral):  Patient reports she woke up on 23 with neck pain and stiffness that has worsened over the last several months. She reports very guarded with movement and driving, reading and working on her phone have become difficult. She states she takes Advil to help manage her pain.   Patient Stated Goal(s):  \"reduced pain and more flexibility\"  Initial Pain Level:   Hip, Neck 2/10   Post Session Pain Level:   Hip, Neck 2/10  Past Medical History/Comorbidities:   Ms. Bates  has a past medical history of Depression, Gastroparesis, Hearing loss, Osteopenia, Osteoporosis, Otosclerosis, Pleurisy, Rheumatic fever, Tinnitus, and Unspecified adverse effect of anesthesia.  Ms. Bates  has a past surgical history that includes 
  Tas w/ add sq 20x 20x 20x   Supine hip ABD w/ TAs L3 TB; 20x L3 TB; 20x L5 TB; 12x   Bridging w/add sq and TAs 15x;  L3 TB; 15x 20x; L3 TB L5 TB; 12x   LTR 15x B 20x B 20x B   DKTC 3 x 30sec 3 x 30sec    Ball Roll ins   12x   EO/IO w/ B LE over ball   12x   Bridging w/ B LE over ball; legs extended   12x   S/L clams   L2 TB; 12x B                     UBE Stepper; L2; 10min Stepper; L3; 10min Stepper; L3; 10min   Access Code: [email or web address]  URL: [email or web address]  Date: 02/22/2024  Prepared by: Yary Huerta    Exercises  - Supine Transversus Abdominis Bracing with Pelvic Floor Contraction  - 1-2 x daily - 7 x weekly - 2-3 sets - 10 reps  - Supine Hip Adduction Isometric with Ball  - 1-2 x daily - 7 x weekly - 2-3 sets - 10 reps  - Supine Bridge with Mini Swiss Ball Between Knees  - 1-2 x daily - 7 x weekly - 2-3 sets - 10 reps  - Hooklying Clamshell with Resistance  - 1-2 x daily - 7 x weekly - 2-3 sets - 10 reps  - Supine Lower Trunk Rotation  - 1-2 x daily - 7 x weekly - 2-3 sets - 10 reps  - Supine Double Knee to Chest Modified  - 1-2 x daily - 7 x weekly - 1 sets - 10 reps    Access Code: [email or web address]  URL: [email or web address]  Date: 11/08/2023  Prepared by: Yary Deanna    Exercises  - Supine Cervical Retraction with Towel  - 2 x daily - 7 x weekly - 2 sets - 10 reps  - Sidelying Thoracic Rotation with Open Book  - 2 x daily - 7 x weekly - 1 sets - 10 reps  - Single Arm Shoulder Extension with Anchored Resistance  - 2 x daily - 7 x weekly - 1 sets - 10 reps  - Shoulder Extension with Resistance - Palms Forward  - 2 x daily - 7 x weekly - 1 sets - 10 reps  - Standing Shoulder Horizontal Abduction with Anchored Resistance  - 2 x daily - 7 x weekly - 1 sets - 10 reps  - Scapular Retraction with Resistance  - 2 x daily - 7 x weekly - 1 sets - 10 reps  - Shoulder External Rotation and Scapular Retraction with Resistance  - 2 x daily - 7 x weekly - 1 sets - 10 reps  Access Code:

## 2024-03-22 ENCOUNTER — APPOINTMENT (OUTPATIENT)
Dept: PHYSICAL THERAPY | Age: 74
End: 2024-03-22
Attending: INTERNAL MEDICINE
Payer: MEDICARE

## 2024-03-26 NOTE — PROGRESS NOTES
daily - 7 x weekly - 2 sets - 10 reps  - Sidelying Thoracic Rotation with Open Book  - 2 x daily - 7 x weekly - 1 sets - 10 reps  - Single Arm Shoulder Extension with Anchored Resistance  - 2 x daily - 7 x weekly - 1 sets - 10 reps  - Shoulder Extension with Resistance - Palms Forward  - 2 x daily - 7 x weekly - 1 sets - 10 reps  - Standing Shoulder Horizontal Abduction with Anchored Resistance  - 2 x daily - 7 x weekly - 1 sets - 10 reps  - Scapular Retraction with Resistance  - 2 x daily - 7 x weekly - 1 sets - 10 reps  - Shoulder External Rotation and Scapular Retraction with Resistance  - 2 x daily - 7 x weekly - 1 sets - 10 reps  Access Code: [email or web address]  URL: [email or web address]  Date: 10/26/2023  Prepared by: Yary Huerta    Exercises  - Standing Cervical Flexion AROM  - 2 x daily - 7 x weekly - 2 sets - 10 reps  - Standing Cervical Rotation AROM  - 2 x daily - 7 x weekly - 2 sets - 10 reps  - Seated Scapular Retraction  - 2 x daily - 7 x weekly - 2 sets - 10 reps  - Standing Backward Shoulder Rolls  - 2 x daily - 7 x weekly - 2 sets - 10 reps  - Seated Shoulder Shrugs  - 2 x daily - 7 x weekly - 2 sets - 10 reps  - Standing Cervical Retraction  - 2 x daily - 7 x weekly - 2 sets - 10 reps  - Supine Cervical Retraction with Towel  - 2 x daily - 7 x weekly - 2 sets - 10 reps  Treatment/Session Summary:    Treatment Assessment:Neutral alignment continues. She did well with more advanced core and B hip strengthening exercises, therefore I added those to her HEP. Patient denies c/o pain at end of treatment.     Communication/Consultation:   HEP 2x daily; Bring phone/ipad up to eye level  Equipment provided today:  HEP w/ instruction sheet; outcomes for DC next week.  Recommendations/Intent for next treatment session: Next visit will focus on advancements to more challenging postural stretching, strengthening and ROM exercises, and manual therapy if needed.    >Total Treatment Billable Duration:  60

## 2024-03-29 ENCOUNTER — HOSPITAL ENCOUNTER (OUTPATIENT)
Dept: PHYSICAL THERAPY | Age: 74
Setting detail: RECURRING SERIES
Discharge: HOME | End: 2024-04-01
Attending: INTERNAL MEDICINE
Payer: MEDICARE

## 2024-03-29 PROCEDURE — 97012 MECHANICAL TRACTION THERAPY: CPT

## 2024-03-29 PROCEDURE — 97110 THERAPEUTIC EXERCISES: CPT

## 2024-03-29 ASSESSMENT — PAIN DESCRIPTION - LOCATION: LOCATION: HIP;BACK;NECK

## 2024-03-29 ASSESSMENT — PAIN DESCRIPTION - DESCRIPTORS: DESCRIPTORS: TIGHTNESS;SORE

## 2024-03-29 ASSESSMENT — PAIN DESCRIPTION - ORIENTATION: ORIENTATION: RIGHT

## 2024-03-29 ASSESSMENT — PAIN SCALES - GENERAL: PAINLEVEL_OUTOF10: 1

## 2024-03-29 ASSESSMENT — PAIN DESCRIPTION - PAIN TYPE: TYPE: CHRONIC PAIN

## 2024-04-04 NOTE — THERAPY DISCHARGE
Francesca Bates  : 1950  Primary: Medicare Part A And B (Medicare)  Secondary: [facility]  [address]  [address]  [facility] [address]  Phone: [phone]  Fax: [phone] Plan Frequency: 1-2x/week x 90 days  Plan of Care/Certification Expiration Date: 24        Plan of Care/Certification Expiration Date:  Plan of Care/Certification Expiration Date: 24    Time In/Out:    Time In: 0840  Time Out: 1000  PT Visit Info:    Plan Frequency: 1-2x/week x 90 days  Total # of Visits to Date: 32      Visit Count:  32                OUTPATIENT PHYSICAL THERAPY:             Discharge Summary 2024               Charge Capture   Episode (Neck pain)         Treatment Diagnosis:     Cervicalgia  Muscle weakness (generalized)  Medical/Referring Diagnosis:    Chronic neck pain    Referring Physician:  Yelitza Khan MD MD Orders:  PT Eval and Treat   Return MD Appt:  10/31/23  Date of Onset:  Onset Date: 23     Allergies:  Metoclopramide, Penicillins, Clarithromycin, Erythromycin, and Adhesive tape  Restrictions/Precautions:    None      Medications Last Reviewed:  2024     SUBJECTIVE   History of Injury/Illness (Reason for Referral):  Patient reports she woke up on 23 with neck pain and stiffness that has worsened over the last several months. She reports very guarded with movement and driving, reading and working on her phone have become difficult. She states she takes Advil to help manage her pain.   Patient Stated Goal(s):  \"reduced pain and more flexibility\"  Initial Pain Level: Right, Left Back, Neck 0/10   Post Session Pain Level: Right, Left Back, Neck 0/10  Past Medical History/Comorbidities:   Ms. Bates  has a past medical history of Depression, Gastroparesis, Hearing loss, Osteopenia, Osteoporosis, Otosclerosis, Pleurisy, Rheumatic fever, Tinnitus, and Unspecified adverse effect of anesthesia.  Ms. Bates  has a past surgical history

## 2024-04-04 NOTE — PROGRESS NOTES
Francesca Bates  : 1950  Primary: Medicare Part A And B (Medicare)  [facility]  [address]  [address]  [facility] [address]  Phone: [phone]  Fax: [phone] Plan Frequency: 1-2x/week x 90 days  Plan of Care/Certification Expiration Date: 24        Plan of Care/Certification Expiration Date:  Plan of Care/Certification Expiration Date: 24    Time In/Out:    Time In: 0840  Time Out: 1000  PT Visit Info:    Plan Frequency: 1-2x/week x 90 days  Total # of Visits to Date: 32      Visit Count:  32    OUTPATIENT PHYSICAL THERAPY:   Treatment Note 2024       Charge Capture   Episode  (Neck pain)               Treatment Diagnosis:    Cervicalgia  Muscle weakness (generalized)  LBP  Walking difficulty  Medical/Referring Diagnosis:    Chronic neck pain    Referring Physician:  Yelitza Khan MD MD Orders:  PT Eval and Treat   Return MD Appt:  10/31/23   Date of Onset:  Onset Date: 23     Allergies:   Metoclopramide, Penicillins, Clarithromycin, Erythromycin, and Adhesive tape  Restrictions/Precautions:   None      Interventions Planned (Treatment may consist of any combination of the following):     See Assessment Note  Subjective Comments: Patient reports her neck and back are feeling good, and she has been consistently compliant with her HEP.   Initial Pain Level:: Right, Left Back, Neck 0/10  Post Session Pain Level:  Right, Left  Back, Neck 0/10  Medications Last Reviewed:  2024  Updated Objective Findings:   Neutral pelvic and lumbar alignment today ; True mild L leg discrepancy; lift given for L  Treatment   THERAPEUTIC EXERCISE: (30 minutes):    Exercises per grid below to improve mobility, strength, and posture .  Required moderate visual, verbal, and tactile cues to promote proper body alignment, promote proper body posture, promote proper body mechanics, and promote proper body breathing techniques.  Progressed resistance,

## 2024-04-05 ENCOUNTER — HOSPITAL ENCOUNTER (OUTPATIENT)
Dept: PHYSICAL THERAPY | Age: 74
Setting detail: RECURRING SERIES
Discharge: HOME | End: 2024-04-08
Attending: INTERNAL MEDICINE
Payer: MEDICARE

## 2024-04-05 PROCEDURE — 97012 MECHANICAL TRACTION THERAPY: CPT

## 2024-04-05 PROCEDURE — 97110 THERAPEUTIC EXERCISES: CPT

## 2024-04-05 PROCEDURE — 97140 MANUAL THERAPY 1/> REGIONS: CPT

## 2024-04-05 ASSESSMENT — PAIN DESCRIPTION - LOCATION: LOCATION: BACK;NECK

## 2024-04-05 ASSESSMENT — PAIN SCALES - GENERAL: PAINLEVEL_OUTOF10: 0

## 2024-04-05 ASSESSMENT — PAIN DESCRIPTION - PAIN TYPE: TYPE: CHRONIC PAIN

## 2024-04-05 ASSESSMENT — PAIN DESCRIPTION - DESCRIPTORS: DESCRIPTORS: SORE;TIGHTNESS

## 2024-04-05 ASSESSMENT — PAIN DESCRIPTION - ORIENTATION: ORIENTATION: RIGHT;LEFT

## 2024-04-15 ENCOUNTER — PATIENT MESSAGE (OUTPATIENT)
Dept: INTERNAL MEDICINE CLINIC | Facility: CLINIC | Age: 74
End: 2024-04-15

## 2024-04-15 DIAGNOSIS — D17.71 ANGIOMYOLIPOMA OF KIDNEY: Primary | ICD-10-CM

## 2024-04-15 NOTE — TELEPHONE ENCOUNTER
Looks like Dr. Khan ordered CT Abdomen Renal Mass 4/3/23. Order has now . I pended. Can you order if this is correct?

## 2024-04-15 NOTE — TELEPHONE ENCOUNTER
From: Francesca Bates  To: Dr. Yelitza Khan  Sent: 4/15/2024 2:54 PM EDT  Subject: abdominal scan    I just got off the phone with central scheduling, and she said that there is no order there for my abdominal scan. Please advise. Thanks.    Francesca Bates

## 2024-04-24 ENCOUNTER — APPOINTMENT (RX ONLY)
Dept: URBAN - METROPOLITAN AREA CLINIC 24 | Facility: CLINIC | Age: 74
Setting detail: DERMATOLOGY
End: 2024-04-24

## 2024-04-24 DIAGNOSIS — L72.8 OTHER FOLLICULAR CYSTS OF THE SKIN AND SUBCUTANEOUS TISSUE: ICD-10-CM

## 2024-04-24 DIAGNOSIS — L57.8 OTHER SKIN CHANGES DUE TO CHRONIC EXPOSURE TO NONIONIZING RADIATION: ICD-10-CM

## 2024-04-24 DIAGNOSIS — D18.0 HEMANGIOMA: ICD-10-CM

## 2024-04-24 DIAGNOSIS — D22 MELANOCYTIC NEVI: ICD-10-CM

## 2024-04-24 DIAGNOSIS — Z71.89 OTHER SPECIFIED COUNSELING: ICD-10-CM

## 2024-04-24 DIAGNOSIS — L82.1 OTHER SEBORRHEIC KERATOSIS: ICD-10-CM

## 2024-04-24 PROBLEM — D22.4 MELANOCYTIC NEVI OF SCALP AND NECK: Status: ACTIVE | Noted: 2024-04-24

## 2024-04-24 PROBLEM — D18.01 HEMANGIOMA OF SKIN AND SUBCUTANEOUS TISSUE: Status: ACTIVE | Noted: 2024-04-24

## 2024-04-24 PROCEDURE — ? OTHER

## 2024-04-24 PROCEDURE — 99213 OFFICE O/P EST LOW 20 MIN: CPT

## 2024-04-24 PROCEDURE — ? COUNSELING

## 2024-04-24 PROCEDURE — ? DEFER

## 2024-04-24 ASSESSMENT — LOCATION SIMPLE DESCRIPTION DERM
LOCATION SIMPLE: CHEST
LOCATION SIMPLE: TRAPEZIAL NECK
LOCATION SIMPLE: SUBMENTAL CHIN
LOCATION SIMPLE: LEFT PRETIBIAL REGION
LOCATION SIMPLE: RIGHT PRETIBIAL REGION
LOCATION SIMPLE: ABDOMEN
LOCATION SIMPLE: RIGHT UPPER BACK
LOCATION SIMPLE: LEFT CHEEK
LOCATION SIMPLE: LEFT LOWER BACK
LOCATION SIMPLE: LEFT UPPER BACK

## 2024-04-24 ASSESSMENT — LOCATION DETAILED DESCRIPTION DERM
LOCATION DETAILED: LEFT SUPERIOR MEDIAL MIDBACK
LOCATION DETAILED: MID TRAPEZIAL NECK
LOCATION DETAILED: RIGHT MID-UPPER BACK
LOCATION DETAILED: RIGHT RIB CAGE
LOCATION DETAILED: SUBMENTAL CHIN
LOCATION DETAILED: LEFT CENTRAL MALAR CHEEK
LOCATION DETAILED: RIGHT SUPERIOR UPPER BACK
LOCATION DETAILED: LEFT PROXIMAL PRETIBIAL REGION
LOCATION DETAILED: LEFT SUPERIOR UPPER BACK
LOCATION DETAILED: RIGHT INFERIOR UPPER BACK
LOCATION DETAILED: SUBXIPHOID
LOCATION DETAILED: RIGHT PROXIMAL PRETIBIAL REGION
LOCATION DETAILED: RIGHT LATERAL SUPERIOR CHEST

## 2024-04-24 ASSESSMENT — LOCATION ZONE DERM
LOCATION ZONE: TRUNK
LOCATION ZONE: FACE
LOCATION ZONE: LEG
LOCATION ZONE: NECK

## 2024-04-24 NOTE — PROCEDURE: OTHER
Note Text (......Xxx Chief Complaint.): This diagnosis correlates with the
Render Risk Assessment In Note?: no
Detail Level: Zone
Other (Free Text): Discussed excision vs monitor
Other (Free Text): Tretinoin (can purchase from our office)

## 2024-04-24 NOTE — PROCEDURE: DEFER
Procedure To Be Performed At Next Visit: Excision
X Size Of Lesion In Cm (Optional): 0
Scheduling Instructions (Optional): 30 min excision with Dr. Rosenthal
Introduction Text (Please End With A Colon): The following procedure was deferred:
Detail Level: Detailed

## 2024-04-25 ENCOUNTER — HOSPITAL ENCOUNTER (OUTPATIENT)
Dept: CT IMAGING | Age: 74
Discharge: HOME | End: 2024-04-27
Payer: MEDICARE

## 2024-04-25 DIAGNOSIS — D17.71 ANGIOMYOLIPOMA OF KIDNEY: ICD-10-CM

## 2024-04-25 LAB — CREAT BLD-MCNC: 0.59 MG/DL (ref 0.8–1.5)

## 2024-04-25 PROCEDURE — 6360000004 HC RX CONTRAST MEDICATION: Performed by: INTERNAL MEDICINE

## 2024-04-25 PROCEDURE — 74170 CT ABD WO CNTRST FLWD CNTRST: CPT

## 2024-04-25 PROCEDURE — 82565 ASSAY OF CREATININE: CPT

## 2024-04-25 RX ADMIN — IOPAMIDOL 100 ML: 755 INJECTION, SOLUTION INTRAVENOUS at 11:12

## 2024-05-09 DIAGNOSIS — M81.0 AGE-RELATED OSTEOPOROSIS WITHOUT CURRENT PATHOLOGICAL FRACTURE: ICD-10-CM

## 2024-05-09 LAB — 25(OH)D3 SERPL-MCNC: 45.5 NG/ML (ref 30–100)

## 2024-05-14 ENCOUNTER — OFFICE VISIT (OUTPATIENT)
Dept: RHEUMATOLOGY | Age: 74
End: 2024-05-14
Payer: MEDICARE

## 2024-05-14 VITALS
RESPIRATION RATE: 16 BRPM | SYSTOLIC BLOOD PRESSURE: 112 MMHG | BODY MASS INDEX: 18.07 KG/M2 | HEART RATE: 72 BPM | DIASTOLIC BLOOD PRESSURE: 68 MMHG | WEIGHT: 102 LBS | HEIGHT: 63 IN

## 2024-05-14 DIAGNOSIS — M81.0 AGE-RELATED OSTEOPOROSIS WITHOUT CURRENT PATHOLOGICAL FRACTURE: Primary | ICD-10-CM

## 2024-05-14 DIAGNOSIS — Z79.83 LONG TERM (CURRENT) USE OF BISPHOSPHONATES: ICD-10-CM

## 2024-05-14 DIAGNOSIS — M81.0 AGE-RELATED OSTEOPOROSIS WITHOUT CURRENT PATHOLOGICAL FRACTURE: ICD-10-CM

## 2024-05-14 LAB
ANION GAP SERPL CALC-SCNC: 9 MMOL/L (ref 9–18)
BUN SERPL-MCNC: 13 MG/DL (ref 8–23)
CALCIUM SERPL-MCNC: 9.2 MG/DL (ref 8.8–10.2)
CHLORIDE SERPL-SCNC: 104 MMOL/L (ref 98–107)
CO2 SERPL-SCNC: 29 MMOL/L (ref 20–28)
CREAT SERPL-MCNC: 0.69 MG/DL (ref 0.6–1.1)
GLUCOSE SERPL-MCNC: 91 MG/DL (ref 70–99)
POTASSIUM SERPL-SCNC: 4.3 MMOL/L (ref 3.5–5.1)
SODIUM SERPL-SCNC: 142 MMOL/L (ref 136–145)

## 2024-05-14 PROCEDURE — 99214 OFFICE O/P EST MOD 30 MIN: CPT | Performed by: INTERNAL MEDICINE

## 2024-05-14 PROCEDURE — 1123F ACP DISCUSS/DSCN MKR DOCD: CPT | Performed by: INTERNAL MEDICINE

## 2024-05-14 PROCEDURE — G8419 CALC BMI OUT NRM PARAM NOF/U: HCPCS | Performed by: INTERNAL MEDICINE

## 2024-05-14 PROCEDURE — G8427 DOCREV CUR MEDS BY ELIG CLIN: HCPCS | Performed by: INTERNAL MEDICINE

## 2024-05-14 PROCEDURE — 3017F COLORECTAL CA SCREEN DOC REV: CPT | Performed by: INTERNAL MEDICINE

## 2024-05-14 PROCEDURE — G8399 PT W/DXA RESULTS DOCUMENT: HCPCS | Performed by: INTERNAL MEDICINE

## 2024-05-14 PROCEDURE — 1036F TOBACCO NON-USER: CPT | Performed by: INTERNAL MEDICINE

## 2024-05-14 PROCEDURE — 1090F PRES/ABSN URINE INCON ASSESS: CPT | Performed by: INTERNAL MEDICINE

## 2024-05-14 RX ORDER — MULTIVIT-MIN/IRON/FOLIC ACID/K 18-600-40
1 CAPSULE ORAL DAILY
COMMUNITY

## 2024-05-14 NOTE — PROGRESS NOTES
24      SUBJECTIVE:  Francesca Bates is a 73 y.o. female that is here for follow-up of:     Osteoporosis   reclast - 2021, May 2022   Fosamax: used for a few years ending 6-7 years ago  FHx OP - mom, PGM  Risk: low body weight, low calcium consumption,     - gastroparesis - follows with Dr. Yañez - ?related to viral infection  - osteoporosis  - depression   ---  Last OV May 2023.  Last reclast#3 May 2023. No side effects. Completed PT for neck and back pain which has helped with symptoms      First Dx of OP in 2021 based on elevated fracture risk.  Completed first Reclast infusion in 2021 without any problems or side effects.  Reviewed labs 2023-CMP, vitamin D  Since her last visit, her   in 2021, then her mother in 2022.      Previously used fosamax for a few years for osteopenia ending +6-7 years ago.  Using Caltrate 1 tab twice daily w/meal which includes 600 Mg calcium carbonate, 800 IU vitamin D per tab   No falls or fractures  No planned dental work. Needs bridge   Cheese, frozen yogurt - few times / week. Eats out less    Walking 2 miles 3-4 times/week  Daughter is  - she is doing core exercises on regular basis        The most recent, and if available, past bone density study details are as follows.  Date L1-4 spine?BMD L1-4 spine T-score Femoral Neck BMD Femoral Neck T-score Lowest  T-score       1.032  -1.3  left: 0.72  -1.8  9%, 1%      1.077  -1.0  left: 0.766  -2.0  9%, 2%    2021  1.019  -1.4  left: 0.739  R 0.801  -2.2  11%, 2.9%   May 2023  Raya cartwright 1.020  L 0.755  R 0.791 -2.0 11%, 2.8%   May 2024 innervision 0.872 -1.6  Left femoral neck -2.5            REVIEW OF SYSTEMS:  A total of 10 systems (musculoskeletal system as stated in the HPI and the following 9 systems) were reviewed with patient today and were negative except as stated in the HPI and except for the following (depicted with an \"X\"):        \"X\"

## 2024-05-14 NOTE — PATIENT INSTRUCTIONS
Do bone scan next year at [facility].  To schedule call: [phone].    Return in 1 year for visit with labs prior.

## 2024-05-20 ENCOUNTER — TELEMEDICINE (OUTPATIENT)
Dept: INTERNAL MEDICINE CLINIC | Facility: CLINIC | Age: 74
End: 2024-05-20
Payer: MEDICARE

## 2024-05-20 DIAGNOSIS — R05.1 ACUTE COUGH: Primary | ICD-10-CM

## 2024-05-20 PROCEDURE — 99213 OFFICE O/P EST LOW 20 MIN: CPT | Performed by: PHYSICIAN ASSISTANT

## 2024-05-20 PROCEDURE — G8427 DOCREV CUR MEDS BY ELIG CLIN: HCPCS | Performed by: PHYSICIAN ASSISTANT

## 2024-05-20 PROCEDURE — 3017F COLORECTAL CA SCREEN DOC REV: CPT | Performed by: PHYSICIAN ASSISTANT

## 2024-05-20 PROCEDURE — 1090F PRES/ABSN URINE INCON ASSESS: CPT | Performed by: PHYSICIAN ASSISTANT

## 2024-05-20 PROCEDURE — G8399 PT W/DXA RESULTS DOCUMENT: HCPCS | Performed by: PHYSICIAN ASSISTANT

## 2024-05-20 PROCEDURE — 1123F ACP DISCUSS/DSCN MKR DOCD: CPT | Performed by: PHYSICIAN ASSISTANT

## 2024-05-20 RX ORDER — DOXYCYCLINE HYCLATE 100 MG
100 TABLET ORAL 2 TIMES DAILY
Qty: 20 TABLET | Refills: 0 | Status: SHIPPED | OUTPATIENT
Start: 2024-05-20 | End: 2024-05-30

## 2024-05-20 SDOH — ECONOMIC STABILITY: FOOD INSECURITY: WITHIN THE PAST 12 MONTHS, YOU WORRIED THAT YOUR FOOD WOULD RUN OUT BEFORE YOU GOT MONEY TO BUY MORE.: NEVER TRUE

## 2024-05-20 SDOH — ECONOMIC STABILITY: INCOME INSECURITY: HOW HARD IS IT FOR YOU TO PAY FOR THE VERY BASICS LIKE FOOD, HOUSING, MEDICAL CARE, AND HEATING?: NOT VERY HARD

## 2024-05-20 SDOH — ECONOMIC STABILITY: FOOD INSECURITY: WITHIN THE PAST 12 MONTHS, THE FOOD YOU BOUGHT JUST DIDN'T LAST AND YOU DIDN'T HAVE MONEY TO GET MORE.: NEVER TRUE

## 2024-05-20 ASSESSMENT — ENCOUNTER SYMPTOMS
COUGH: 1
NAUSEA: 0
CONSTIPATION: 0
EYE PAIN: 0
ABDOMINAL PAIN: 0
VOMITING: 0
COLOR CHANGE: 0
WHEEZING: 0
CHEST TIGHTNESS: 0
VOICE CHANGE: 0
SORE THROAT: 0
DIARRHEA: 0
SHORTNESS OF BREATH: 0

## 2024-05-20 ASSESSMENT — PATIENT HEALTH QUESTIONNAIRE - PHQ9
SUM OF ALL RESPONSES TO PHQ9 QUESTIONS 1 & 2: 0
SUM OF ALL RESPONSES TO PHQ QUESTIONS 1-9: 0
1. LITTLE INTEREST OR PLEASURE IN DOING THINGS: NOT AT ALL
SUM OF ALL RESPONSES TO PHQ QUESTIONS 1-9: 0
2. FEELING DOWN, DEPRESSED OR HOPELESS: NOT AT ALL
SUM OF ALL RESPONSES TO PHQ QUESTIONS 1-9: 0
SUM OF ALL RESPONSES TO PHQ QUESTIONS 1-9: 0

## 2024-05-20 NOTE — PROGRESS NOTES
PROGRESS NOTE    Francesca Bates is a 73 y.o. female seen for a follow up visit regarding   Chief Complaint   Patient presents with    Cough     Symptoms started last Wednesday. No recent COVID test. Strep was negative. Granddaughter was also sick    Fever     Taking tylenol. Goes up to 99.5 and then takes tylenol        HPI  Started Wednesday. Pt sts she went to [facility] Thursday and NP rxned called her mami Perles. Pt c/o congestion and cough. Low fever 99.5    Cough  Associated symptoms include a fever. Pertinent negatives include no chest pain, ear pain, headaches, rash, sore throat, shortness of breath or wheezing.   Fever   Associated symptoms include coughing. Pertinent negatives include no abdominal pain, chest pain, congestion, diarrhea, ear pain, headaches, nausea, rash, sore throat, urinary pain, vomiting or wheezing.       Past Medical History, Past Surgical History, Family history, Social History, and Medications were all reviewed with the patient today and updated as necessary.       Current Outpatient Medications   Medication Sig Dispense Refill    doxycycline hyclate (VIBRA-TABS) 100 MG tablet Take 1 tablet by mouth 2 times daily for 10 days 20 tablet 0    Multiple Vitamins-Minerals (PRESERVISION AREDS 2 PO) Take 1 tablet by mouth daily      Cholecalciferol (VITAMIN D) 50 MCG (2000 UT) CAPS capsule Take 1 capsule by mouth daily      PARoxetine (PAXIL) 30 MG tablet Take 1 tablet by mouth daily 90 tablet 3    NONFORMULARY Take 10 mg by mouth in the morning and at bedtime DomperidoneDomperidone      triamcinolone acetonide (KENALOG) 10 MG/ML injection Inject 1 mL into the articular space once EVERY 6 MO      calcium carbonate-vitamin D (CALTRATE) 600-400 MG-UNIT TABS per tab Take 1 tablet by mouth 2 times daily      loratadine (CLARITIN) 10 MG tablet Take 1 tablet by mouth daily      zoledronic acid (RECLAST) 5 MG/100ML SOLN Infuse 100 mLs intravenously once       No current facility-administered

## 2024-05-21 RX ORDER — SODIUM CHLORIDE 9 MG/ML
INJECTION, SOLUTION INTRAVENOUS CONTINUOUS
OUTPATIENT
Start: 2024-05-21

## 2024-05-21 RX ORDER — DIPHENHYDRAMINE HYDROCHLORIDE 50 MG/ML
50 INJECTION INTRAMUSCULAR; INTRAVENOUS
OUTPATIENT
Start: 2024-05-21

## 2024-05-21 RX ORDER — EPINEPHRINE 1 MG/ML
0.3 INJECTION, SOLUTION, CONCENTRATE INTRAVENOUS PRN
OUTPATIENT
Start: 2024-05-21

## 2024-05-21 RX ORDER — FAMOTIDINE 10 MG/ML
20 INJECTION, SOLUTION INTRAVENOUS
OUTPATIENT
Start: 2024-05-21

## 2024-05-21 RX ORDER — ACETAMINOPHEN 325 MG/1
650 TABLET ORAL
OUTPATIENT
Start: 2024-05-21

## 2024-05-21 RX ORDER — ONDANSETRON 2 MG/ML
8 INJECTION INTRAMUSCULAR; INTRAVENOUS
OUTPATIENT
Start: 2024-05-21

## 2024-05-21 RX ORDER — ZOLEDRONIC ACID 5 MG/100ML
5 INJECTION, SOLUTION INTRAVENOUS ONCE
OUTPATIENT
Start: 2024-05-21 | End: 2024-05-21

## 2024-05-21 RX ORDER — ALBUTEROL SULFATE 90 UG/1
4 AEROSOL, METERED RESPIRATORY (INHALATION) PRN
OUTPATIENT
Start: 2024-05-21

## 2024-05-22 SDOH — HEALTH STABILITY: PHYSICAL HEALTH: ON AVERAGE, HOW MANY DAYS PER WEEK DO YOU ENGAGE IN MODERATE TO STRENUOUS EXERCISE (LIKE A BRISK WALK)?: 4 DAYS

## 2024-05-22 SDOH — HEALTH STABILITY: PHYSICAL HEALTH: ON AVERAGE, HOW MANY MINUTES DO YOU ENGAGE IN EXERCISE AT THIS LEVEL?: 30 MIN

## 2024-05-23 ENCOUNTER — OFFICE VISIT (OUTPATIENT)
Dept: INTERNAL MEDICINE CLINIC | Facility: CLINIC | Age: 74
End: 2024-05-23

## 2024-05-23 VITALS
OXYGEN SATURATION: 97 % | HEART RATE: 86 BPM | SYSTOLIC BLOOD PRESSURE: 116 MMHG | WEIGHT: 101.6 LBS | BODY MASS INDEX: 18 KG/M2 | TEMPERATURE: 98.8 F | DIASTOLIC BLOOD PRESSURE: 64 MMHG | HEIGHT: 63 IN

## 2024-05-23 DIAGNOSIS — R05.1 ACUTE COUGH: Primary | ICD-10-CM

## 2024-05-23 DIAGNOSIS — E55.9 VITAMIN D DEFICIENCY: ICD-10-CM

## 2024-05-23 DIAGNOSIS — J06.9 UPPER RESPIRATORY TRACT INFECTION, UNSPECIFIED TYPE: ICD-10-CM

## 2024-05-23 DIAGNOSIS — E78.2 MODERATE MIXED HYPERLIPIDEMIA NOT REQUIRING STATIN THERAPY: ICD-10-CM

## 2024-05-23 DIAGNOSIS — M81.0 AGE-RELATED OSTEOPOROSIS WITHOUT CURRENT PATHOLOGICAL FRACTURE: ICD-10-CM

## 2024-05-23 DIAGNOSIS — F41.8 ANXIETY WITH DEPRESSION: ICD-10-CM

## 2024-05-23 RX ORDER — CODEINE PHOSPHATE/GUAIFENESIN 10-100MG/5
5 LIQUID (ML) ORAL 3 TIMES DAILY PRN
Qty: 75 ML | Refills: 0 | Status: SHIPPED | OUTPATIENT
Start: 2024-05-23 | End: 2024-05-28

## 2024-05-23 ASSESSMENT — ENCOUNTER SYMPTOMS
SHORTNESS OF BREATH: 0
WHEEZING: 0
SINUS PAIN: 0
COUGH: 1
SINUS PRESSURE: 0

## 2024-05-23 NOTE — PROGRESS NOTES
PROGRESS NOTE      Chief Complaint   Patient presents with    New Patient    Pharyngitis     Tx'd at [facility] 5/16, VV 5/20 with [facility]  (-) strep, (-) Covid  Currently on PO ABT Doxycyline   C/o cough, congestion, sore throat, fatigue  Denies SOB, dizziness        HPI    Cough:8 days ago developed malaise and sore throat and cough. Low grade fever. Urgent care - day 2. Negative strep. Given tessalon perles without benefit. Home test for Covid negative. 3 days ago virtual visit with Sital - prescribed Doxycycline with benefit. Granddaughter with cold last week.Visited aunt in nursing home also just prior to getting sick.Persistent cough - post tussive emesis. Tdap current (thinks 3 years ago before latrell was born)            Past Medical History, Past Surgical History, Family history, Social History, and Medications were all reviewed and updated as necessary.     Current Outpatient Medications   Medication Sig Dispense Refill    tretinoin (RETIN-A) 0.025 % cream Apply topically nightly Apply topically nightly.      guaiFENesin-codeine (TUSSI-ORGANIDIN NR) 100-10 MG/5ML syrup Take 5 mLs by mouth 3 times daily as needed for Cough for up to 5 days. Max Daily Amount: 15 mLs 75 mL 0    doxycycline hyclate (VIBRA-TABS) 100 MG tablet Take 1 tablet by mouth 2 times daily for 10 days 20 tablet 0    Multiple Vitamins-Minerals (PRESERVISION AREDS 2 PO) Take 1 tablet by mouth daily      Cholecalciferol (VITAMIN D) 50 MCG (2000 UT) CAPS capsule Take 1 capsule by mouth daily      PARoxetine (PAXIL) 30 MG tablet Take 1 tablet by mouth daily 90 tablet 3    NONFORMULARY Take 10 mg by mouth in the morning and at bedtime DomperidoneDomperidone      triamcinolone acetonide (KENALOG) 10 MG/ML injection Inject 1 mL into the articular space once EVERY 6 MO      calcium carbonate-vitamin D (CALTRATE) 600-400 MG-UNIT TABS per tab Take 1 tablet by mouth 2 times daily      loratadine (CLARITIN) 10 MG tablet Take 1 tablet by mouth

## 2024-06-18 ENCOUNTER — NURSE ONLY (OUTPATIENT)
Dept: RHEUMATOLOGY | Age: 74
End: 2024-06-18
Payer: MEDICARE

## 2024-06-18 VITALS — DIASTOLIC BLOOD PRESSURE: 56 MMHG | SYSTOLIC BLOOD PRESSURE: 118 MMHG | TEMPERATURE: 98.1 F | HEART RATE: 78 BPM

## 2024-06-18 DIAGNOSIS — M81.0 AGE-RELATED OSTEOPOROSIS WITHOUT CURRENT PATHOLOGICAL FRACTURE: Primary | ICD-10-CM

## 2024-06-18 PROCEDURE — 96365 THER/PROPH/DIAG IV INF INIT: CPT | Performed by: INTERNAL MEDICINE

## 2024-06-18 RX ORDER — EPINEPHRINE 1 MG/ML
0.3 INJECTION, SOLUTION, CONCENTRATE INTRAVENOUS PRN
OUTPATIENT
Start: 2025-06-17

## 2024-06-18 RX ORDER — ACETAMINOPHEN 325 MG/1
650 TABLET ORAL
OUTPATIENT
Start: 2025-06-17

## 2024-06-18 RX ORDER — ONDANSETRON 2 MG/ML
8 INJECTION INTRAMUSCULAR; INTRAVENOUS
OUTPATIENT
Start: 2025-06-17

## 2024-06-18 RX ORDER — ZOLEDRONIC ACID 5 MG/100ML
5 INJECTION, SOLUTION INTRAVENOUS ONCE
OUTPATIENT
Start: 2025-06-17 | End: 2025-06-17

## 2024-06-18 RX ORDER — DIPHENHYDRAMINE HYDROCHLORIDE 50 MG/ML
50 INJECTION INTRAMUSCULAR; INTRAVENOUS
OUTPATIENT
Start: 2025-06-17

## 2024-06-18 RX ORDER — SODIUM CHLORIDE 9 MG/ML
INJECTION, SOLUTION INTRAVENOUS CONTINUOUS
OUTPATIENT
Start: 2025-06-17

## 2024-06-18 RX ORDER — ZOLEDRONIC ACID 5 MG/100ML
5 INJECTION, SOLUTION INTRAVENOUS ONCE
Status: COMPLETED | OUTPATIENT
Start: 2024-06-18 | End: 2024-06-18

## 2024-06-18 RX ORDER — ALBUTEROL SULFATE 90 UG/1
4 AEROSOL, METERED RESPIRATORY (INHALATION) PRN
OUTPATIENT
Start: 2025-06-17

## 2024-06-18 RX ADMIN — ZOLEDRONIC ACID 5 MG: 5 INJECTION, SOLUTION INTRAVENOUS at 11:08

## 2024-06-18 NOTE — PROGRESS NOTES
[facility]  [address] [place] [address], [address]  [place] [address]  Office : [phone], Fax: [phone]       Reclast 5mg/100ml infused today over 30  minutes for safety. Infusion tolerated well without complications. Advised patient to continue taking vit d, calcium, hydrate, tylenol if aches occur  post infusion. Call with any problems or side effects. Infusion placed in right hand vein by Desirae Beyer RN .    IV insertion time: 1105   Medication start time: 1108   Medication completion time: 1140    Patient discharged feeling good  and instructed to call the office with any post-infusion issues.    Pre-infusion/injection questionairre for osteoporosis    1. Have you had or are you planning any dental work?  no    2. Are you taking your calcium and vitamin D? yes    3. When was your last osteoporosis treatment?  5/9/2023    4. Have you had any recent fractures? no

## 2024-08-21 RX ORDER — PAROXETINE 30 MG/1
30 TABLET, FILM COATED ORAL DAILY
Qty: 90 TABLET | Refills: 3 | OUTPATIENT
Start: 2024-08-21

## 2024-08-22 RX ORDER — PAROXETINE 30 MG/1
30 TABLET, FILM COATED ORAL DAILY
Qty: 90 TABLET | Refills: 0 | Status: SHIPPED | OUTPATIENT
Start: 2024-08-22

## 2024-08-26 DIAGNOSIS — E78.2 MODERATE MIXED HYPERLIPIDEMIA NOT REQUIRING STATIN THERAPY: ICD-10-CM

## 2024-08-26 DIAGNOSIS — E55.9 VITAMIN D DEFICIENCY: ICD-10-CM

## 2024-08-26 PROBLEM — R63.6 UNDERWEIGHT: Status: ACTIVE | Noted: 2024-08-26

## 2024-08-26 LAB
25(OH)D3 SERPL-MCNC: 46.1 NG/ML (ref 30–100)
ALBUMIN SERPL-MCNC: 3.8 G/DL (ref 3.2–4.6)
ALBUMIN/GLOB SERPL: 1.5 (ref 1–1.9)
ALP SERPL-CCNC: 45 U/L (ref 35–104)
ALT SERPL-CCNC: 15 U/L (ref 12–65)
ANION GAP SERPL CALC-SCNC: 8 MMOL/L (ref 9–18)
AST SERPL-CCNC: 28 U/L (ref 15–37)
BACTERIA URNS QL MICRO: NEGATIVE /HPF
BILIRUB SERPL-MCNC: 1 MG/DL (ref 0–1.2)
BUN SERPL-MCNC: 16 MG/DL (ref 8–23)
CALCIUM SERPL-MCNC: 9.4 MG/DL (ref 8.8–10.2)
CHLORIDE SERPL-SCNC: 105 MMOL/L (ref 98–107)
CHOLEST SERPL-MCNC: 205 MG/DL (ref 0–200)
CO2 SERPL-SCNC: 30 MMOL/L (ref 20–28)
CREAT SERPL-MCNC: 0.76 MG/DL (ref 0.6–1.1)
EPI CELLS #/AREA URNS HPF: NORMAL /HPF (ref 0–5)
GLOBULIN SER CALC-MCNC: 2.5 G/DL (ref 2.3–3.5)
GLUCOSE SERPL-MCNC: 88 MG/DL (ref 70–99)
HDLC SERPL-MCNC: 70 MG/DL (ref 40–60)
HDLC SERPL: 2.9 (ref 0–5)
HYALINE CASTS URNS QL MICRO: NORMAL /LPF
LDLC SERPL CALC-MCNC: 118 MG/DL (ref 0–100)
POTASSIUM SERPL-SCNC: 4.3 MMOL/L (ref 3.5–5.1)
PROT SERPL-MCNC: 6.3 G/DL (ref 6.3–8.2)
RBC #/AREA URNS HPF: NORMAL /HPF (ref 0–5)
SODIUM SERPL-SCNC: 143 MMOL/L (ref 136–145)
TRIGL SERPL-MCNC: 87 MG/DL (ref 0–150)
TSH, 3RD GENERATION: 2.49 UIU/ML (ref 0.27–4.2)
VLDLC SERPL CALC-MCNC: 17 MG/DL (ref 6–23)
WBC URNS QL MICRO: NORMAL /HPF (ref 0–4)

## 2024-08-26 NOTE — PROGRESS NOTES
PROGRESS NOTE      Chief Complaint   Patient presents with    Hyperlipidemia     3 month follow-up with EXAM and lab review       HPI    Osteoporosis: Reclast per rheumatology - Dr East    Underweight: Baseline BMI on low side chronically. Dromperidone for gastroparesis-no nausea. Good appetite and does not restrict food. No bowel changes    Vitamin d deficiency: Vitamin D3 2000 mg    Anxiety and depression: Paxil 30 mg - since 2021. Previously on Lexapro    ETD, tinnitus, decreased hearing: Followed by ENT. Dr Garsia. Benefit with hearing aids    Allergic rhinitis:    Gastroparesis: history of persistent nausea following GI bug. Reglan causes abnormal movements. Continues on domperidone with benefit.     Hyperlipidemia: No medical management    Colonoscopy: 2012. History of colon polyps. Past due for 5 year recall.           Past Medical History, Past Surgical History, Family history, Social History, and Medications were all reviewed and updated as necessary.     Current Outpatient Medications   Medication Sig Dispense Refill    PARoxetine (PAXIL) 30 MG tablet Take 1 tablet by mouth daily 90 tablet 3    tretinoin (RETIN-A) 0.025 % cream Apply topically nightly Apply topically nightly.      Multiple Vitamins-Minerals (PRESERVISION AREDS 2 PO) Take 1 tablet by mouth daily      Cholecalciferol (VITAMIN D) 50 MCG (2000 UT) CAPS capsule Take 1 capsule by mouth daily      NONFORMULARY Take 10 mg by mouth in the morning and at bedtime DomperidoneDomperidone      triamcinolone acetonide (KENALOG) 10 MG/ML injection Inject 1 mL into the articular space once EVERY 6 MO      calcium carbonate-vitamin D (CALTRATE) 600-400 MG-UNIT TABS per tab Take 1 tablet by mouth 2 times daily      loratadine (CLARITIN) 10 MG tablet Take 1 tablet by mouth daily      zoledronic acid (RECLAST) 5 MG/100ML SOLN Infuse 100 mLs intravenously once       No current facility-administered medications for this visit.     Allergies   Allergen    Neurological:      General: No focal deficit present.      Mental Status: She is alert and oriented to person, place, and time.      Motor: Motor function is intact.      Gait: Gait normal.   Psychiatric:         Attention and Perception: Attention normal.         Mood and Affect: Mood normal.         Speech: Speech normal.         Behavior: Behavior normal.

## 2024-08-28 ENCOUNTER — RX ONLY (OUTPATIENT)
Age: 74
Setting detail: RX ONLY
End: 2024-08-28

## 2024-08-28 ENCOUNTER — OFFICE VISIT (OUTPATIENT)
Dept: INTERNAL MEDICINE CLINIC | Facility: CLINIC | Age: 74
End: 2024-08-28
Payer: MEDICARE

## 2024-08-28 VITALS
BODY MASS INDEX: 18.36 KG/M2 | DIASTOLIC BLOOD PRESSURE: 62 MMHG | HEIGHT: 63 IN | WEIGHT: 103.6 LBS | SYSTOLIC BLOOD PRESSURE: 104 MMHG

## 2024-08-28 DIAGNOSIS — Z86.010 HISTORY OF COLON POLYPS: ICD-10-CM

## 2024-08-28 DIAGNOSIS — R63.6 UNDERWEIGHT: ICD-10-CM

## 2024-08-28 DIAGNOSIS — J30.9 ALLERGIC RHINITIS, UNSPECIFIED SEASONALITY, UNSPECIFIED TRIGGER: ICD-10-CM

## 2024-08-28 DIAGNOSIS — Z97.4 HEARING AID WORN: ICD-10-CM

## 2024-08-28 DIAGNOSIS — H91.90 HEARING LOSS, UNSPECIFIED HEARING LOSS TYPE, UNSPECIFIED LATERALITY: ICD-10-CM

## 2024-08-28 DIAGNOSIS — M81.0 AGE-RELATED OSTEOPOROSIS WITHOUT CURRENT PATHOLOGICAL FRACTURE: Primary | ICD-10-CM

## 2024-08-28 DIAGNOSIS — E55.9 VITAMIN D DEFICIENCY: ICD-10-CM

## 2024-08-28 DIAGNOSIS — F41.8 ANXIETY WITH DEPRESSION: ICD-10-CM

## 2024-08-28 PROBLEM — Z86.0100 HISTORY OF COLON POLYPS: Status: ACTIVE | Noted: 2024-08-28

## 2024-08-28 PROCEDURE — 99215 OFFICE O/P EST HI 40 MIN: CPT | Performed by: NURSE PRACTITIONER

## 2024-08-28 PROCEDURE — G8399 PT W/DXA RESULTS DOCUMENT: HCPCS | Performed by: NURSE PRACTITIONER

## 2024-08-28 PROCEDURE — G8427 DOCREV CUR MEDS BY ELIG CLIN: HCPCS | Performed by: NURSE PRACTITIONER

## 2024-08-28 PROCEDURE — 1036F TOBACCO NON-USER: CPT | Performed by: NURSE PRACTITIONER

## 2024-08-28 PROCEDURE — G8420 CALC BMI NORM PARAMETERS: HCPCS | Performed by: NURSE PRACTITIONER

## 2024-08-28 PROCEDURE — 3017F COLORECTAL CA SCREEN DOC REV: CPT | Performed by: NURSE PRACTITIONER

## 2024-08-28 PROCEDURE — 1123F ACP DISCUSS/DSCN MKR DOCD: CPT | Performed by: NURSE PRACTITIONER

## 2024-08-28 PROCEDURE — 1090F PRES/ABSN URINE INCON ASSESS: CPT | Performed by: NURSE PRACTITIONER

## 2024-08-28 RX ORDER — PAROXETINE 30 MG/1
30 TABLET, FILM COATED ORAL DAILY
Qty: 90 TABLET | Refills: 3 | Status: SHIPPED | OUTPATIENT
Start: 2024-08-28

## 2024-08-28 RX ORDER — TRETIONIN 0.25 MG/G
CREAM TOPICAL
Qty: 45 | Refills: 6 | Status: ERX

## 2024-08-28 ASSESSMENT — ENCOUNTER SYMPTOMS
DIARRHEA: 0
COLOR CHANGE: 0
BLOOD IN STOOL: 0
BACK PAIN: 0
CONSTIPATION: 0
COUGH: 0
SHORTNESS OF BREATH: 0
ABDOMINAL PAIN: 0
EYE ITCHING: 0
NAUSEA: 0
EYE REDNESS: 0

## 2025-02-03 ENCOUNTER — OFFICE VISIT (OUTPATIENT)
Dept: INTERNAL MEDICINE CLINIC | Facility: CLINIC | Age: 75
End: 2025-02-03
Payer: MEDICARE

## 2025-02-03 VITALS
DIASTOLIC BLOOD PRESSURE: 68 MMHG | SYSTOLIC BLOOD PRESSURE: 114 MMHG | WEIGHT: 105 LBS | BODY MASS INDEX: 18.61 KG/M2 | HEIGHT: 63 IN

## 2025-02-03 DIAGNOSIS — M54.9 UPPER BACK PAIN: Primary | ICD-10-CM

## 2025-02-03 DIAGNOSIS — M81.0 AGE-RELATED OSTEOPOROSIS WITHOUT CURRENT PATHOLOGICAL FRACTURE: ICD-10-CM

## 2025-02-03 PROCEDURE — G8420 CALC BMI NORM PARAMETERS: HCPCS | Performed by: NURSE PRACTITIONER

## 2025-02-03 PROCEDURE — G8399 PT W/DXA RESULTS DOCUMENT: HCPCS | Performed by: NURSE PRACTITIONER

## 2025-02-03 PROCEDURE — 1160F RVW MEDS BY RX/DR IN RCRD: CPT | Performed by: NURSE PRACTITIONER

## 2025-02-03 PROCEDURE — 1159F MED LIST DOCD IN RCRD: CPT | Performed by: NURSE PRACTITIONER

## 2025-02-03 PROCEDURE — 99213 OFFICE O/P EST LOW 20 MIN: CPT | Performed by: NURSE PRACTITIONER

## 2025-02-03 PROCEDURE — G8427 DOCREV CUR MEDS BY ELIG CLIN: HCPCS | Performed by: NURSE PRACTITIONER

## 2025-02-03 PROCEDURE — 1090F PRES/ABSN URINE INCON ASSESS: CPT | Performed by: NURSE PRACTITIONER

## 2025-02-03 PROCEDURE — 1123F ACP DISCUSS/DSCN MKR DOCD: CPT | Performed by: NURSE PRACTITIONER

## 2025-02-03 PROCEDURE — 1036F TOBACCO NON-USER: CPT | Performed by: NURSE PRACTITIONER

## 2025-02-03 RX ORDER — TIZANIDINE 2 MG/1
2 TABLET ORAL NIGHTLY PRN
Qty: 30 TABLET | Refills: 0 | Status: SHIPPED | OUTPATIENT
Start: 2025-02-03

## 2025-02-03 SDOH — ECONOMIC STABILITY: FOOD INSECURITY: WITHIN THE PAST 12 MONTHS, YOU WORRIED THAT YOUR FOOD WOULD RUN OUT BEFORE YOU GOT MONEY TO BUY MORE.: NEVER TRUE

## 2025-02-03 SDOH — ECONOMIC STABILITY: FOOD INSECURITY: WITHIN THE PAST 12 MONTHS, THE FOOD YOU BOUGHT JUST DIDN'T LAST AND YOU DIDN'T HAVE MONEY TO GET MORE.: NEVER TRUE

## 2025-02-03 ASSESSMENT — ENCOUNTER SYMPTOMS
WHEEZING: 0
BACK PAIN: 1
COUGH: 0
SHORTNESS OF BREATH: 0

## 2025-02-03 ASSESSMENT — PATIENT HEALTH QUESTIONNAIRE - PHQ9
5. POOR APPETITE OR OVEREATING: NOT AT ALL
SUM OF ALL RESPONSES TO PHQ9 QUESTIONS 1 & 2: 0
SUM OF ALL RESPONSES TO PHQ QUESTIONS 1-9: 0
1. LITTLE INTEREST OR PLEASURE IN DOING THINGS: NOT AT ALL
SUM OF ALL RESPONSES TO PHQ QUESTIONS 1-9: 0
7. TROUBLE CONCENTRATING ON THINGS, SUCH AS READING THE NEWSPAPER OR WATCHING TELEVISION: NOT AT ALL
2. FEELING DOWN, DEPRESSED OR HOPELESS: NOT AT ALL
4. FEELING TIRED OR HAVING LITTLE ENERGY: NOT AT ALL
SUM OF ALL RESPONSES TO PHQ QUESTIONS 1-9: 0
SUM OF ALL RESPONSES TO PHQ QUESTIONS 1-9: 0
8. MOVING OR SPEAKING SO SLOWLY THAT OTHER PEOPLE COULD HAVE NOTICED. OR THE OPPOSITE, BEING SO FIGETY OR RESTLESS THAT YOU HAVE BEEN MOVING AROUND A LOT MORE THAN USUAL: NOT AT ALL
6. FEELING BAD ABOUT YOURSELF - OR THAT YOU ARE A FAILURE OR HAVE LET YOURSELF OR YOUR FAMILY DOWN: NOT AT ALL
10. IF YOU CHECKED OFF ANY PROBLEMS, HOW DIFFICULT HAVE THESE PROBLEMS MADE IT FOR YOU TO DO YOUR WORK, TAKE CARE OF THINGS AT HOME, OR GET ALONG WITH OTHER PEOPLE: NOT DIFFICULT AT ALL
9. THOUGHTS THAT YOU WOULD BE BETTER OFF DEAD, OR OF HURTING YOURSELF: NOT AT ALL
3. TROUBLE FALLING OR STAYING ASLEEP: NOT AT ALL

## 2025-02-03 NOTE — PROGRESS NOTES
(ZANAFLEX) 2 MG tablet; Take 1 tablet by mouth nightly as needed (muscle spasms)  -     XR THORACIC SPINE (3 VIEWS); Future    Age-related osteoporosis without current pathological fracture      Mid scapular pain - think musculoskeletal. Xray to rule out compression fracture with osteoporosis. OTC Salonpas and ibuprofen 600 mg with food three times daily. Zanaflex at night.Will call if persistent symptoms.    FOLLOW UP    Return for Pending test results.       REVIEW OF SYSTEMS    Review of Systems   Constitutional:  Negative for chills and fever.   Respiratory:  Negative for cough, shortness of breath and wheezing.    Cardiovascular:  Negative for chest pain.   Musculoskeletal:  Positive for back pain.   Neurological:  Negative for weakness and numbness.       PHYSICAL EXAM    /68   Ht 1.588 m (5' 2.5\")   Wt 47.6 kg (105 lb)   BMI 18.90 kg/m²      Physical Exam  Vitals reviewed.   Constitutional:       General: She is not in acute distress.     Appearance: She is not ill-appearing.   Musculoskeletal:      Thoracic back: Spasms and tenderness present. No bony tenderness.        Back:    Skin:     Findings: No rash.

## 2025-03-03 ENCOUNTER — OFFICE VISIT (OUTPATIENT)
Dept: INTERNAL MEDICINE CLINIC | Facility: CLINIC | Age: 75
End: 2025-03-03
Payer: MEDICARE

## 2025-03-03 VITALS
DIASTOLIC BLOOD PRESSURE: 64 MMHG | HEIGHT: 63 IN | BODY MASS INDEX: 18.32 KG/M2 | WEIGHT: 103.4 LBS | SYSTOLIC BLOOD PRESSURE: 114 MMHG

## 2025-03-03 DIAGNOSIS — R93.89 ABNORMAL CXR: ICD-10-CM

## 2025-03-03 DIAGNOSIS — F40.240 CLAUSTROPHOBIA: ICD-10-CM

## 2025-03-03 DIAGNOSIS — M54.6 ACUTE LEFT-SIDED THORACIC BACK PAIN: Primary | ICD-10-CM

## 2025-03-03 DIAGNOSIS — M81.0 AGE-RELATED OSTEOPOROSIS WITHOUT CURRENT PATHOLOGICAL FRACTURE: ICD-10-CM

## 2025-03-03 PROCEDURE — 1160F RVW MEDS BY RX/DR IN RCRD: CPT | Performed by: NURSE PRACTITIONER

## 2025-03-03 PROCEDURE — 1123F ACP DISCUSS/DSCN MKR DOCD: CPT | Performed by: NURSE PRACTITIONER

## 2025-03-03 PROCEDURE — G8420 CALC BMI NORM PARAMETERS: HCPCS | Performed by: NURSE PRACTITIONER

## 2025-03-03 PROCEDURE — 1090F PRES/ABSN URINE INCON ASSESS: CPT | Performed by: NURSE PRACTITIONER

## 2025-03-03 PROCEDURE — G8427 DOCREV CUR MEDS BY ELIG CLIN: HCPCS | Performed by: NURSE PRACTITIONER

## 2025-03-03 PROCEDURE — 1159F MED LIST DOCD IN RCRD: CPT | Performed by: NURSE PRACTITIONER

## 2025-03-03 PROCEDURE — 99214 OFFICE O/P EST MOD 30 MIN: CPT | Performed by: NURSE PRACTITIONER

## 2025-03-03 PROCEDURE — G8399 PT W/DXA RESULTS DOCUMENT: HCPCS | Performed by: NURSE PRACTITIONER

## 2025-03-03 PROCEDURE — 1036F TOBACCO NON-USER: CPT | Performed by: NURSE PRACTITIONER

## 2025-03-03 RX ORDER — ALPRAZOLAM 0.25 MG
TABLET ORAL
Qty: 4 TABLET | Refills: 0 | Status: SHIPPED | OUTPATIENT
Start: 2025-03-03 | End: 2025-04-03

## 2025-03-03 ASSESSMENT — ENCOUNTER SYMPTOMS
COUGH: 1
BACK PAIN: 1
WHEEZING: 0
SHORTNESS OF BREATH: 0

## 2025-03-03 NOTE — PROGRESS NOTES
Other (See Comments)     Due to domperidone    Adhesive Tape Rash       ASSESSMENT and PLAN    Francesca was seen today for back pain.    Diagnoses and all orders for this visit:    Acute left-sided thoracic back pain  -     Cancel: MRI THORACIC SPINE WO CONTRAST; Future  -     MRI THORACIC SPINE WO CONTRAST; Future    Abnormal CXR    Age-related osteoporosis without current pathological fracture    Claustrophobia  -     ALPRAZolam (XANAX) 0.25 MG tablet; 1-2 tablets 30 minutes prior to MRI    Reviewed xray reports, notes from urgent care.   Schedule MRI of spine with back pain for 1 month with radicular pain, history of osteoporosis and bony abnormality on xray imaging.   Can take xanax prior to MRI study. Will need .     Medical problems and test results were reviewed with the patient today.     FOLLOW UP    Return for Pending test results.       REVIEW OF SYSTEMS    Review of Systems   Constitutional:  Negative for chills and fever.   Respiratory:  Positive for cough (dry). Negative for shortness of breath and wheezing.    Musculoskeletal:  Positive for back pain.       PHYSICAL EXAM    /64   Ht 1.588 m (5' 2.5\")   Wt 46.9 kg (103 lb 6.4 oz)   BMI 18.61 kg/m²      Physical Exam  Vitals reviewed.   Constitutional:       Appearance: Normal appearance. She is not ill-appearing.   Pulmonary:      Effort: Pulmonary effort is normal.      Breath sounds: No wheezing, rhonchi or rales.   Chest:      Chest wall: No tenderness.   Musculoskeletal:      Thoracic back: Spasms and bony tenderness present.        Back:    Skin:     Findings: No rash.   Neurological:      Mental Status: She is alert and oriented to person, place, and time.   Psychiatric:         Mood and Affect: Mood normal.         Thought Content: Thought content normal.

## 2025-03-04 SDOH — HEALTH STABILITY: PHYSICAL HEALTH: ON AVERAGE, HOW MANY DAYS PER WEEK DO YOU ENGAGE IN MODERATE TO STRENUOUS EXERCISE (LIKE A BRISK WALK)?: 4 DAYS

## 2025-03-04 SDOH — HEALTH STABILITY: PHYSICAL HEALTH: ON AVERAGE, HOW MANY MINUTES DO YOU ENGAGE IN EXERCISE AT THIS LEVEL?: 40 MIN

## 2025-03-04 ASSESSMENT — PATIENT HEALTH QUESTIONNAIRE - PHQ9
1. LITTLE INTEREST OR PLEASURE IN DOING THINGS: NOT AT ALL
9. THOUGHTS THAT YOU WOULD BE BETTER OFF DEAD, OR OF HURTING YOURSELF: NOT AT ALL
6. FEELING BAD ABOUT YOURSELF - OR THAT YOU ARE A FAILURE OR HAVE LET YOURSELF OR YOUR FAMILY DOWN: NOT AT ALL
7. TROUBLE CONCENTRATING ON THINGS, SUCH AS READING THE NEWSPAPER OR WATCHING TELEVISION: NOT AT ALL
SUM OF ALL RESPONSES TO PHQ QUESTIONS 1-9: 1
SUM OF ALL RESPONSES TO PHQ QUESTIONS 1-9: 1
3. TROUBLE FALLING OR STAYING ASLEEP: NOT AT ALL
SUM OF ALL RESPONSES TO PHQ QUESTIONS 1-9: 1
8. MOVING OR SPEAKING SO SLOWLY THAT OTHER PEOPLE COULD HAVE NOTICED. OR THE OPPOSITE, BEING SO FIGETY OR RESTLESS THAT YOU HAVE BEEN MOVING AROUND A LOT MORE THAN USUAL: NOT AT ALL
5. POOR APPETITE OR OVEREATING: NOT AT ALL
2. FEELING DOWN, DEPRESSED OR HOPELESS: SEVERAL DAYS
SUM OF ALL RESPONSES TO PHQ QUESTIONS 1-9: 1
10. IF YOU CHECKED OFF ANY PROBLEMS, HOW DIFFICULT HAVE THESE PROBLEMS MADE IT FOR YOU TO DO YOUR WORK, TAKE CARE OF THINGS AT HOME, OR GET ALONG WITH OTHER PEOPLE: NOT DIFFICULT AT ALL
4. FEELING TIRED OR HAVING LITTLE ENERGY: NOT AT ALL

## 2025-03-04 ASSESSMENT — LIFESTYLE VARIABLES
HOW MANY STANDARD DRINKS CONTAINING ALCOHOL DO YOU HAVE ON A TYPICAL DAY: 0
HOW OFTEN DO YOU HAVE A DRINK CONTAINING ALCOHOL: 1
HOW OFTEN DO YOU HAVE SIX OR MORE DRINKS ON ONE OCCASION: 1
HOW MANY STANDARD DRINKS CONTAINING ALCOHOL DO YOU HAVE ON A TYPICAL DAY: PATIENT DOES NOT DRINK
HOW OFTEN DO YOU HAVE A DRINK CONTAINING ALCOHOL: NEVER

## 2025-03-07 ENCOUNTER — OFFICE VISIT (OUTPATIENT)
Dept: INTERNAL MEDICINE CLINIC | Facility: CLINIC | Age: 75
End: 2025-03-07

## 2025-03-07 VITALS
HEIGHT: 63 IN | DIASTOLIC BLOOD PRESSURE: 62 MMHG | SYSTOLIC BLOOD PRESSURE: 106 MMHG | WEIGHT: 104.4 LBS | BODY MASS INDEX: 18.5 KG/M2

## 2025-03-07 DIAGNOSIS — K14.8 TONGUE LESION: ICD-10-CM

## 2025-03-07 DIAGNOSIS — R26.89 BALANCE PROBLEMS: ICD-10-CM

## 2025-03-07 DIAGNOSIS — Z87.19 HISTORY OF CANKER SORES: ICD-10-CM

## 2025-03-07 DIAGNOSIS — E55.9 VITAMIN D DEFICIENCY: ICD-10-CM

## 2025-03-07 DIAGNOSIS — R63.6 UNDERWEIGHT: ICD-10-CM

## 2025-03-07 DIAGNOSIS — Z00.00 MEDICARE ANNUAL WELLNESS VISIT, SUBSEQUENT: Primary | ICD-10-CM

## 2025-03-07 DIAGNOSIS — M81.0 AGE-RELATED OSTEOPOROSIS WITHOUT CURRENT PATHOLOGICAL FRACTURE: ICD-10-CM

## 2025-03-07 DIAGNOSIS — M85.80 OSTEOPENIA, UNSPECIFIED LOCATION: ICD-10-CM

## 2025-03-07 DIAGNOSIS — F41.8 ANXIETY WITH DEPRESSION: ICD-10-CM

## 2025-03-07 DIAGNOSIS — J30.9 ALLERGIC RHINITIS, UNSPECIFIED SEASONALITY, UNSPECIFIED TRIGGER: ICD-10-CM

## 2025-03-07 DIAGNOSIS — Z12.31 ENCOUNTER FOR SCREENING MAMMOGRAM FOR MALIGNANT NEOPLASM OF BREAST: ICD-10-CM

## 2025-03-07 DIAGNOSIS — Z12.11 SCREENING FOR MALIGNANT NEOPLASM OF COLON: ICD-10-CM

## 2025-03-07 DIAGNOSIS — Z97.4 HEARING AID WORN: ICD-10-CM

## 2025-03-07 DIAGNOSIS — K31.84 GASTROPARESIS: ICD-10-CM

## 2025-03-07 RX ORDER — PAROXETINE 30 MG/1
30 TABLET, FILM COATED ORAL DAILY
Qty: 90 TABLET | Refills: 3 | Status: SHIPPED | OUTPATIENT
Start: 2025-03-07

## 2025-03-07 NOTE — PATIENT INSTRUCTIONS
aspirin. Wait for an ambulance. Do not try to drive yourself.  Watch closely for changes in your health, and be sure to contact your doctor if you have any problems.  Where can you learn more?  Go to https://www.[employer].net/patientEd and enter F075 to learn more about \"A Healthy Heart: Care Instructions.\"  Current as of: July 31, 2024  Content Version: 14.3  © 2024 [employer].   Care instructions adapted under license by [employer]. If you have questions about a medical condition or this instruction, always ask your healthcare professional. [employer] disclaims any warranty or liability for your use of this information.    Personalized Preventive Plan for Francesca Bates - 3/7/2025  Medicare offers a range of preventive health benefits. Some of the tests and screenings are paid in full while other may be subject to a deductible, co-insurance, and/or copay.  Some of these benefits include a comprehensive review of your medical history including lifestyle, illnesses that may run in your family, and various assessments and screenings as appropriate.  After reviewing your medical record and screening and assessments performed today your provider may have ordered immunizations, labs, imaging, and/or referrals for you.  A list of these orders (if applicable) as well as your Preventive Care list are included within your After Visit Summary for your review.

## 2025-03-07 NOTE — PROGRESS NOTES
Medicare Annual Wellness Visit    Francesca Bates is here for Medicare AWV    Assessment & Plan   Medicare annual wellness visit, subsequent  Encounter for screening mammogram for malignant neoplasm of breast  -     APRIL PERFECTO DIGITAL SCREEN BILATERAL; Future  Screening for malignant neoplasm of colon  -     External Referral To Gastroenterology  Age-related osteoporosis without current pathological fracture  Anxiety with depression  -     PARoxetine (PAXIL) 30 MG tablet; Take 1 tablet by mouth daily, Disp-90 tablet, R-3Normal  Allergic rhinitis, unspecified seasonality, unspecified trigger  Gastroparesis  -     External Referral To Gastroenterology  Underweight  Vitamin D deficiency  Osteopenia, unspecified location  Tongue lesion  History of canker sores  Hearing aid worn  Balance problems  -     BS - Physical Therapy, [facility]  Discussed BMI and healthy weight and diet, weight bearing exercise, smoking avoidance, sun protection and medication compliance. Reviewed appropriate health maintenance screening. The patient was counseled regarding regular monthly SBE, screening procedures and recommended schedules for immunizations, mammography, Pap smears, GI hemoccult testing, colonoscopy and BMD.    Order for mammogram. DEXA per rheumatology due this year. Planning RSV and latest Covid booster. Has ACP and will bring copy. Referral to GI and plans to call to schedule past due colonoscopy.   Continue Paxil for anxiety. Stressed compliance. Would need taper if decision to discontinue  Order to tspine MRI in place.   Tongue lesion: Will call if lesion does not completely resolve (thinks early canker sore that she has had for years)    Return in about 1 year (around 3/7/2026) for AWV VV with fasting labs/EXAM 1-2 weeks later.     Subjective   The following acute and/or chronic problems were also addressed today:    Osteoporosis: Reclast per rheumatology - Dr East. Due for DEXA this year

## 2025-03-11 ENCOUNTER — HOSPITAL ENCOUNTER (OUTPATIENT)
Dept: PHYSICAL THERAPY | Age: 75
Setting detail: RECURRING SERIES
Discharge: HOME | End: 2025-03-14
Payer: MEDICARE

## 2025-03-11 DIAGNOSIS — M25.551 PAIN IN RIGHT HIP: Primary | ICD-10-CM

## 2025-03-11 DIAGNOSIS — M89.8X1 PAIN OF LEFT SCAPULA: ICD-10-CM

## 2025-03-11 DIAGNOSIS — R26.2 DIFFICULTY IN WALKING, NOT ELSEWHERE CLASSIFIED: ICD-10-CM

## 2025-03-11 DIAGNOSIS — R26.89 BALANCE PROBLEM: ICD-10-CM

## 2025-03-11 DIAGNOSIS — M62.81 MUSCLE WEAKNESS (GENERALIZED): ICD-10-CM

## 2025-03-11 PROCEDURE — 97110 THERAPEUTIC EXERCISES: CPT | Performed by: PHYSICAL THERAPIST

## 2025-03-11 PROCEDURE — 97161 PT EVAL LOW COMPLEX 20 MIN: CPT | Performed by: PHYSICAL THERAPIST

## 2025-03-11 ASSESSMENT — PAIN DESCRIPTION - DESCRIPTORS: DESCRIPTORS: ACHING;SORE

## 2025-03-11 ASSESSMENT — PAIN DESCRIPTION - ORIENTATION: ORIENTATION: RIGHT;LEFT

## 2025-03-11 ASSESSMENT — PAIN DESCRIPTION - PAIN TYPE: TYPE: CHRONIC PAIN

## 2025-03-11 ASSESSMENT — PAIN DESCRIPTION - LOCATION: LOCATION: HIP;BACK

## 2025-03-11 ASSESSMENT — PAIN SCALES - GENERAL: PAINLEVEL_OUTOF10: 1

## 2025-03-11 NOTE — THERAPY EVALUATION
Francesca Bates  : 1950  Primary: Medicare Part A And B (Medicare)  Secondary: [place] [address]  [address]  [facility] [address]  Phone: [phone]  Fax: [phone] Plan Frequency: 2x/week x 90 days  Plan of Care/Certification Expiration Date: 25        Plan of Care/Certification Expiration Date:  Plan of Care/Certification Expiration Date: 25    Frequency/Duration: Plan Frequency: 2x/week x 90 days      Time In/Out:   Time In: 172  Time Out: 181      PT Visit Info:    Total # of Visits to Date: 1      Visit Count:  1                OUTPATIENT PHYSICAL THERAPY:             Initial Assessment 3/11/2025               Episode (Bone Health/Balance )         Treatment Diagnosis:     Pain in right hip  Pain of left scapula  Muscle weakness (generalized)  Difficulty in walking, not elsewhere classified  Balance problem  Medical/Referring Diagnosis:    Balance problems    Referring Physician:  Tabby Toro, ELIZABETH - CNP  MD Orders:  PT Eval and Treat   Return MD Appt:  None scheduled  Date of Onset:  Onset Date: 24     Allergies:  Metoclopramide, Clarithromycin, Erythromycin, and Adhesive tape  Restrictions/Precautions:    None      Medications Last Reviewed: 3/11/2025     SUBJECTIVE   History of Injury/Illness (Reason for Referral):  Ms. Bates is a 73 yo female referred to physical therapy by Tabby Toro* 2/2 Balance problems [R26.89]. She is currently osteopenic w/ DEXA score values ranging from -1.4- -2.0 as of May 2023. Her PCP notes state a new DEXA scan will be ordered..  Patient reports mild R hip and L scapular pain today. She states she is being sent for an MRI to assess the scapular situation. She denies having any falls, but states her balance has been off a bit.   Patient Stated Goal(s):  \"Improve balance\"  Initial Pain Level:  Right, Left Hip, Back 1/10   Post Session Pain Level: Right, Left Hip, Back 0/10  Past

## 2025-03-11 NOTE — PROGRESS NOTES
Francesca Bates  : 1950  Primary: Medicare Part A And B (Medicare)  Secondary: [facility]  [address]  [address]  [facility] [address]  Phone: [phone]  Fax: [phone] Plan Frequency: 2x/week x 90 days  Plan of Care/Certification Expiration Date: 25        Plan of Care/Certification Expiration Date:  Plan of Care/Certification Expiration Date: 25    Frequency/Duration: Plan Frequency: 2x/week x 90 days      Time In/Out:   Time In: 172  Time Out: 181      PT Visit Info:    Total # of Visits to Date: 1      Visit Count:  1    OUTPATIENT PHYSICAL THERAPY:   Treatment Note 3/11/2025       Episode  (Bone Health/Balance )               Treatment Diagnosis:    Pain in right hip  Pain of left scapula  Muscle weakness (generalized)  Difficulty in walking, not elsewhere classified  Balance problem  Medical/Referring Diagnosis:    Balance problems    Referring Physician:  Tabby Toro APRN - CNP  MD Orders:  PT Eval and Treat   Return MD Appt:  None scheduled   Date of Onset:  Onset Date: 24     Allergies:   Metoclopramide, Clarithromycin, Erythromycin, and Adhesive tape  Restrictions/Precautions:   None      Interventions Planned (Treatment may consist of any combination of the following):     See Assessment Note    Subjective Comments:   Patient reports mild R hip and L scapular pain. She states she is being sent for an MRI to assess the scapular situation. She denies having any falls, but states her balance has been off a bit.   Initial Pain Level: Right, Left Hip, Back 1/10  Post Session Pain Level: Right, Left  Hip, Back 0/10  Medications Last Reviewed: 3/11/2025  Updated Objective Findings:  See Evaluation Note from today L anterior innominate rotation and true R leg length discrepancy; Level 2 lift given to place in R shoe.   Treatment   THERAPEUTIC EXERCISE: (25 minutes):    Exercises per grid below to improve mobility, strength, balance, and

## 2025-03-11 NOTE — PROGRESS NOTES
hard in PT, and with her HEP.    Communication/Consultation:   Increase water intake  Equipment provided today:  None   Recommendations/Intent for next treatment session: Next visit will focus on advancements to more challenging postural, core, B hip strengthening and balance activities.    >Total Treatment Billable Duration:  40 minutes therex  Time In: 1634  Time Out: 1718     Yary Huerta PT         Charge Capture  Events  [facility] Portal  Appt Desk  Attendance Report     Future Appointments   Date Time Provider Department Center   3/20/2025  4:30 PM Yary Huerta, PT [facility]   3/25/2025  8:45 AM [facility] MRI UNIT 1 [facility]   3/25/2025  3:45 PM Yary Huerta, PT [facility]   3/27/2025  3:45 PM Yary Huerta, PT [facility]   4/15/2025  3:45 PM Yary Huerta, PT [facility]   4/17/2025  3:45 PM Yary Huerta, PT [facility]   4/22/2025  3:45 PM Yary Huerta, PT [facility]   4/24/2025  3:45 PM Yary Huerta, PT [facility]   5/19/2025 10:00 AM Debra East MD [facility]

## 2025-03-13 ENCOUNTER — HOSPITAL ENCOUNTER (OUTPATIENT)
Dept: PHYSICAL THERAPY | Age: 75
Setting detail: RECURRING SERIES
Discharge: HOME | End: 2025-03-16
Payer: MEDICARE

## 2025-03-13 PROCEDURE — 97110 THERAPEUTIC EXERCISES: CPT | Performed by: PHYSICAL THERAPIST

## 2025-03-13 ASSESSMENT — PAIN DESCRIPTION - LOCATION: LOCATION: BACK;CHEST

## 2025-03-13 ASSESSMENT — PAIN SCALES - GENERAL: PAINLEVEL_OUTOF10: 1

## 2025-03-13 ASSESSMENT — PAIN DESCRIPTION - DESCRIPTORS: DESCRIPTORS: ACHING;SORE;TIGHTNESS

## 2025-03-13 ASSESSMENT — PAIN DESCRIPTION - ORIENTATION: ORIENTATION: LEFT

## 2025-03-13 ASSESSMENT — PAIN DESCRIPTION - PAIN TYPE: TYPE: CHRONIC PAIN

## 2025-03-18 NOTE — PROGRESS NOTES
Francesca Bates  : 1950  Primary: Medicare Part A And B (Medicare)  Secondary: [facility] STATE [facility]  [address]  [address]  [facility] [address]  Phone: [phone]  Fax: [phone] Plan Frequency: 2x/week x 90 days  Plan of Care/Certification Expiration Date: 25        Plan of Care/Certification Expiration Date:  Plan of Care/Certification Expiration Date: 25    Frequency/Duration: Plan Frequency: 2x/week x 90 days      Time In/Out:   Time In: 1640  Time Out: 1725      PT Visit Info:    Total # of Visits to Date: 3      Visit Count:  3    OUTPATIENT PHYSICAL THERAPY:   Treatment Note 3/20/2025       Episode  (Bone Health/Balance )               Treatment Diagnosis:    Pain in right hip  Pain of left scapula  Muscle weakness (generalized)  Difficulty in walking, not elsewhere classified  Balance problem  Medical/Referring Diagnosis:        Referring Physician:  Tabby Toro APRN - CNP MD Orders:  PT Eval and Treat   Return MD Appt:  None scheduled   Date of Onset:  Onset Date: 24     Allergies:   Metoclopramide, Clarithromycin, Erythromycin, and Adhesive tape  Restrictions/Precautions:   None      Interventions Planned (Treatment may consist of any combination of the following):     See Assessment Note    Subjective Comments:   Patient reports mild L scapular pain continues when performing supine scapular retraction.   Initial Pain Level: Left Back 1/10  Post Session Pain Level: Left  Back 0/10  Medications Last Reviewed: 3/20/2025  Updated Objective Findings:  Neutral pelvic alignment today;  true R leg length discrepancy; Level 2 lift given to place in R shoe.(3.11.25)    Treatment   THERAPEUTIC EXERCISE: (45 minutes):    Exercises per grid below to improve mobility, strength, balance, and coordination.  Required moderate visual, verbal, and tactile cues to promote proper body alignment, promote proper body posture, promote proper body mechanics, and

## 2025-03-20 ENCOUNTER — HOSPITAL ENCOUNTER (OUTPATIENT)
Dept: PHYSICAL THERAPY | Age: 75
Setting detail: RECURRING SERIES
Discharge: HOME | End: 2025-03-23
Payer: MEDICARE

## 2025-03-20 PROCEDURE — 97110 THERAPEUTIC EXERCISES: CPT | Performed by: PHYSICAL THERAPIST

## 2025-03-20 ASSESSMENT — PAIN DESCRIPTION - LOCATION: LOCATION: BACK

## 2025-03-20 ASSESSMENT — PAIN SCALES - GENERAL: PAINLEVEL_OUTOF10: 1

## 2025-03-20 ASSESSMENT — PAIN DESCRIPTION - DESCRIPTORS: DESCRIPTORS: ACHING;SORE;SHOOTING

## 2025-03-20 ASSESSMENT — PAIN DESCRIPTION - PAIN TYPE: TYPE: CHRONIC PAIN

## 2025-03-20 ASSESSMENT — PAIN DESCRIPTION - ORIENTATION: ORIENTATION: LEFT

## 2025-03-21 NOTE — PROGRESS NOTES
Francesca Bates  : 1950  Primary: Medicare Part A And B (Medicare)  Secondary: [facility] STATE O [facility]  [address]  [address]  [facility] [address]  Phone: [phone]  Fax: [phone] Plan Frequency: 2x/week x 90 days  Plan of Care/Certification Expiration Date: 25        Plan of Care/Certification Expiration Date:  Plan of Care/Certification Expiration Date: 25    Frequency/Duration: Plan Frequency: 2x/week x 90 days      Time In/Out:   Time In: 1559  Time Out: 1658      PT Visit Info:    Total # of Visits to Date: 4      Visit Count:  4    OUTPATIENT PHYSICAL THERAPY:   Treatment Note 3/25/2025       Episode  (Bone Health/Balance )               Treatment Diagnosis:    Pain in right hip  Pain of left scapula  Muscle weakness (generalized)  Difficulty in walking, not elsewhere classified  Balance problem  Medical/Referring Diagnosis:        Referring Physician:  Tabby Toro APRN - CNP MD Orders:  PT Eval and Treat   Return MD Appt:  None scheduled   Date of Onset:  Onset Date: 24     Allergies:   Metoclopramide, Clarithromycin, Erythromycin, and Adhesive tape  Restrictions/Precautions:   None      Interventions Planned (Treatment may consist of any combination of the following):     See Assessment Note    Subjective Comments:   Patient reports overall doing well, however, feels that \"open book\" that we added last visit caused an increase in her vertigo symptoms.   Initial Pain Level: Left Back 1/10  Post Session Pain Level: Left  Back 0/10  Medications Last Reviewed: 3/25/2025  Updated Objective Findings:  Neutral pelvic alignment today;  true R leg length discrepancy; Level 2 lift given to place in R shoe.(3..)    Treatment   THERAPEUTIC EXERCISE: (53 minutes):    Exercises per grid below to improve mobility, strength, balance, and coordination.  Required moderate visual, verbal, and tactile cues to promote proper body alignment, promote proper body

## 2025-03-25 ENCOUNTER — HOSPITAL ENCOUNTER (OUTPATIENT)
Dept: PHYSICAL THERAPY | Age: 75
Setting detail: RECURRING SERIES
Discharge: HOME | End: 2025-03-28
Payer: MEDICARE

## 2025-03-25 PROCEDURE — 97110 THERAPEUTIC EXERCISES: CPT | Performed by: PHYSICAL THERAPIST

## 2025-03-25 ASSESSMENT — PAIN SCALES - GENERAL: PAINLEVEL_OUTOF10: 1

## 2025-03-25 ASSESSMENT — PAIN DESCRIPTION - PAIN TYPE: TYPE: CHRONIC PAIN

## 2025-03-25 ASSESSMENT — PAIN DESCRIPTION - DESCRIPTORS: DESCRIPTORS: ACHING

## 2025-03-25 ASSESSMENT — PAIN DESCRIPTION - LOCATION: LOCATION: BACK

## 2025-03-25 ASSESSMENT — PAIN DESCRIPTION - ORIENTATION: ORIENTATION: LEFT

## 2025-03-25 NOTE — PROGRESS NOTES
promote proper body breathing techniques.  Progressed resistance, range, repetitions, and complexity of movement as indicated.   Date:  3.25.25 Date:  3.27.25   Activity/Exercise     Muscle Energy Technique     TAs     Tas w/add sq     Bridging w/TA and add sq     LTR     DKTC     Supine cervical retraction     Supine scapular retraction     Open book     B shoulder ext ( Palms forw/palms back) 4# vest; on airex RTB; 20x each 4# vest; on airex RTB; 20x each   B shoulder horiz abd 4# vest on airex RTB; 20x 4# vest on airex RTB; 20x   B shoulder rows 4# vest on airex RTB; 20x 4# vest on airex RTB; 20x   B shoulder ER 4# vest on airex RTB; 20x 4# vest on airex RTB; 20x   Sit-stand  On airex beam; L1 Tb thighs; 20x w/ OH ball lift 4# vest; 3# weight w/ OH lift on beam; 30x   Resisted side-stepping 4# vest; L1 Tb thighs; airex beam ( 6' x 6)    Heel-to-toe walking 4# vest; L1 Tb thighs; airex beam ( 6' x 6)    Stdg rockerboard ( f/b; s/s; center)  6min w/ 1# weights in UE and 4# vest donned   Stdg marching on airex pad  1# in B UE alt lift; 20x B   Manual pec major/minor stretching     Stdg scapular retraction w/ ER     Stdg trunk ext stretch w/ PPT     Stdg HS/GS stretch 3 x 30sec 5 x 30sec   Recumbent stepper L5; 10min L5; 10min   Access Code: [email or web address]  URL: [email or web address]  Date: 03/25/2025  Prepared by: Yary Huerta    Exercises  - Sit to Stand with Resistance Around Legs  - 2 x daily - 7 x weekly - 2 sets - 10 reps  - Side Stepping with Resistance at Thighs and Counter Support  - 2 x daily - 7 x weekly - 2 sets - 10 reps  - Forward and Backward Monster Walk with Resistance at Ankles and Counter Support  - 2 x daily - 7 x weekly - 2 sets - 10 reps  Access Code: [email or web address]  URL: [email or web address]  Date: 03/11/2025  Prepared by: Yary Huerta    Exercises  - Supine Cervical Retraction with Towel  - 1-2 x daily - 7 x weekly - 1-2 sets - 10 reps  - Supine Scapular Retraction  - 1-2

## 2025-03-27 ENCOUNTER — HOSPITAL ENCOUNTER (OUTPATIENT)
Dept: PHYSICAL THERAPY | Age: 75
Setting detail: RECURRING SERIES
Discharge: HOME | End: 2025-03-30
Payer: MEDICARE

## 2025-03-27 PROCEDURE — 97110 THERAPEUTIC EXERCISES: CPT | Performed by: PHYSICAL THERAPIST

## 2025-03-27 ASSESSMENT — PAIN DESCRIPTION - PAIN TYPE: TYPE: CHRONIC PAIN

## 2025-03-27 ASSESSMENT — PAIN DESCRIPTION - LOCATION
LOCATION: BACK
LOCATION: BACK

## 2025-03-27 ASSESSMENT — PAIN SCALES - GENERAL: PAINLEVEL_OUTOF10: 0

## 2025-03-31 ENCOUNTER — TELEPHONE (OUTPATIENT)
Dept: INTERNAL MEDICINE CLINIC | Facility: CLINIC | Age: 75
End: 2025-03-31

## 2025-04-01 ENCOUNTER — HOSPITAL ENCOUNTER (OUTPATIENT)
Dept: MRI IMAGING | Age: 75
Discharge: HOME | End: 2025-04-04
Payer: MEDICARE

## 2025-04-01 DIAGNOSIS — M54.6 ACUTE LEFT-SIDED THORACIC BACK PAIN: ICD-10-CM

## 2025-04-01 PROCEDURE — 72146 MRI CHEST SPINE W/O DYE: CPT

## 2025-04-02 ENCOUNTER — RESULTS FOLLOW-UP (OUTPATIENT)
Dept: INTERNAL MEDICINE CLINIC | Facility: CLINIC | Age: 75
End: 2025-04-02

## 2025-04-14 NOTE — PROGRESS NOTES
Francesca Bates  : 1950  Primary: Medicare Part A And B (Medicare)  Secondary: [facility] STATE O [facility]  [address]  [address]  [facility] [address]  Phone: [phone]  Fax: [phone] Plan Frequency: 2x/week x 90 days  Plan of Care/Certification Expiration Date: 25        Plan of Care/Certification Expiration Date:  Plan of Care/Certification Expiration Date: 25    Frequency/Duration: Plan Frequency: 2x/week x 90 days      Time In/Out:   Time In: 1548  Time Out: 1638      PT Visit Info:    Total # of Visits to Date: 6      Visit Count:  6    OUTPATIENT PHYSICAL THERAPY:   Treatment Note 4/15/2025       Episode  (Bone Health/Balance )               Treatment Diagnosis:    Pain in right hip  Pain of left scapula  Muscle weakness (generalized)  Difficulty in walking, not elsewhere classified  Balance problem  Medical/Referring Diagnosis:        Referring Physician:  Tabby Toro APRN - CNP MD Orders:  PT Eval and Treat   Return MD Appt:  None scheduled   Date of Onset:  Onset Date: 24     Allergies:   Metoclopramide, Clarithromycin, Erythromycin, and Adhesive tape  Restrictions/Precautions:   None      Interventions Planned (Treatment may consist of any combination of the following):     See Assessment Note    Subjective Comments:   Patient reports she's been doing well, and has had no L scapular pain. She states she's been compliant with her HEP.   Initial Pain Level: Left Back 0/10  Post Session Pain Level: Left  Back 0/10  Medications Last Reviewed: 4/15/2025  Updated Objective Findings:  Neutral pelvic alignment today;  true R leg length discrepancy; Level 2 lift given to place in R shoe.(3.11.25)    Treatment   THERAPEUTIC EXERCISE: (45 minutes):    Exercises per grid below to improve mobility, strength, balance, and coordination.  Required moderate visual, verbal, and tactile cues to promote proper body alignment, promote proper body posture, promote

## 2025-04-15 ENCOUNTER — HOSPITAL ENCOUNTER (OUTPATIENT)
Dept: PHYSICAL THERAPY | Age: 75
Setting detail: RECURRING SERIES
Discharge: HOME | End: 2025-04-18
Payer: MEDICARE

## 2025-04-15 PROCEDURE — 97110 THERAPEUTIC EXERCISES: CPT | Performed by: PHYSICAL THERAPIST

## 2025-04-15 ASSESSMENT — PAIN DESCRIPTION - PAIN TYPE: TYPE: CHRONIC PAIN

## 2025-04-15 ASSESSMENT — PAIN DESCRIPTION - ORIENTATION: ORIENTATION: LEFT

## 2025-04-15 ASSESSMENT — PAIN DESCRIPTION - LOCATION: LOCATION: BACK

## 2025-04-15 ASSESSMENT — PAIN SCALES - GENERAL: PAINLEVEL_OUTOF10: 0

## 2025-04-15 ASSESSMENT — PAIN DESCRIPTION - DESCRIPTORS: DESCRIPTORS: ACHING

## 2025-04-15 NOTE — PROGRESS NOTES
- 1-2 sets - 10 reps  - Supine Scapular Retraction  - 1-2 x daily - 7 x weekly - 1-2 sets - 10 reps  - Supine Transversus Abdominis Bracing with Pelvic Floor Contraction  - 1-2 x daily - 7 x weekly - 1-2 sets - 10 reps  - Supine Hip Adduction Isometric with Ball  - 1-2 x daily - 7 x weekly - 1-2 sets - 10 reps  - Supine Bridge with Mini Swiss Ball Between Knees  - 1-2 x daily - 7 x weekly - 1-2 sets - 10 reps  - Hooklying Clamshell with Resistance  - 1-2 x daily - 7 x weekly - 1-2 sets - 10 reps  - Supine Lower Trunk Rotation  - 1-2 x daily - 7 x weekly - 1-2 sets - 10 reps  - Supine Double Knee to Chest Modified  - 1-2 x daily - 7 x weekly - 1-2 sets - 5 reps - 30sec hold  Treatment/Session Summary:    Treatment Assessment: Patient did well with balance and strengthening activities today. She remains challenged by forward/back rockerboard, but is improving. She was fatigued after increase in resistance with side-stepping and diagonals.      Communication/Consultation:   Increase water intake  Equipment provided today:   none    Recommendations/Intent for next treatment session: Next visit will focus on advancements to more challenging postural, core, B hip strengthening and balance activities.    >Total Treatment Billable Duration:  45 minutes therex  Time In: 1545  Time Out: 1635     Yary Huerta PT         Charge Capture  Events  [facility]  Appt Desk  Attendance Report     Future Appointments   Date Time Provider Department Center   4/22/2025  3:45 PM Yary Huerta PT [email or web address]   4/24/2025  3:45 PM Yary Huerta PT [facility]   9/30/2025 11:00 AM Melvin Cuba, DO [facility]

## 2025-04-17 ENCOUNTER — HOSPITAL ENCOUNTER (OUTPATIENT)
Dept: PHYSICAL THERAPY | Age: 75
Setting detail: RECURRING SERIES
Discharge: HOME | End: 2025-04-20
Payer: MEDICARE

## 2025-04-17 PROCEDURE — 97110 THERAPEUTIC EXERCISES: CPT | Performed by: PHYSICAL THERAPIST

## 2025-04-17 ASSESSMENT — PAIN DESCRIPTION - PAIN TYPE
TYPE: CHRONIC PAIN
TYPE: CHRONIC PAIN

## 2025-04-17 ASSESSMENT — PAIN DESCRIPTION - DESCRIPTORS
DESCRIPTORS: SORE;TIGHTNESS
DESCRIPTORS: ACHING;SORE;TIGHTNESS

## 2025-04-17 ASSESSMENT — PAIN DESCRIPTION - ORIENTATION
ORIENTATION: LEFT
ORIENTATION: LEFT

## 2025-04-17 ASSESSMENT — PAIN DESCRIPTION - LOCATION
LOCATION: BACK;NECK
LOCATION: NECK;BACK

## 2025-04-17 ASSESSMENT — PAIN SCALES - GENERAL: PAINLEVEL_OUTOF10: 0

## 2025-04-17 NOTE — PROGRESS NOTES
Francesca Bates  : 1950  Primary: Medicare Part A And B (Medicare)  Secondary: [facility] STATE [facility]  [address]  [address]  [facility] [address]  Phone: [phone]  Fax: [phone] Plan Frequency: 2x/week x 90 days  Plan of Care/Certification Expiration Date: 25        Plan of Care/Certification Expiration Date:  Plan of Care/Certification Expiration Date: 25    Frequency/Duration: Plan Frequency: 2x/week x 90 days      Time In/Out:   Time In: 1549  Time Out: 1638      PT Visit Info:    Total # of Visits to Date: 8      Visit Count:  8    OUTPATIENT PHYSICAL THERAPY:   Treatment Note 2025       Episode  (Bone Health/Balance )               Treatment Diagnosis:    Pain in right hip  Pain of left scapula  Muscle weakness (generalized)  Difficulty in walking, not elsewhere classified  Balance problem  Medical/Referring Diagnosis:        Referring Physician:  Tabby Toro APRN - CNP MD Orders:  PT Eval and Treat   Return MD Appt:  None scheduled   Date of Onset:  Onset Date: 24     Allergies:   Metoclopramide, Clarithromycin, Erythromycin, and Adhesive tape  Restrictions/Precautions:   None      Interventions Planned (Treatment may consist of any combination of the following):     See Assessment Note    Subjective Comments:   Patient reports she's had little to no pain over the last week.   Initial Pain Level: Left Neck, Back 0/10  Post Session Pain Level: Left  Neck, Back 0/10  Medications Last Reviewed: 2025  Updated Objective Findings:  Neutral pelvic alignment today;  true R leg length discrepancy; Level 2 lift given to place in R shoe.(3.11.25)    Treatment   THERAPEUTIC EXERCISE: (45 minutes):    Exercises per grid below to improve mobility, strength, balance, and coordination.  Required moderate visual, verbal, and tactile cues to promote proper body alignment, promote proper body posture, promote proper body mechanics, and promote proper

## 2025-04-22 ENCOUNTER — HOSPITAL ENCOUNTER (OUTPATIENT)
Dept: PHYSICAL THERAPY | Age: 75
Setting detail: RECURRING SERIES
Discharge: HOME | End: 2025-04-25
Payer: MEDICARE

## 2025-04-22 PROCEDURE — 97110 THERAPEUTIC EXERCISES: CPT | Performed by: PHYSICAL THERAPIST

## 2025-04-22 ASSESSMENT — PAIN SCALES - GENERAL: PAINLEVEL_OUTOF10: 0

## 2025-04-22 ASSESSMENT — PAIN DESCRIPTION - LOCATION: LOCATION: NECK;BACK

## 2025-04-22 ASSESSMENT — PAIN DESCRIPTION - PAIN TYPE: TYPE: CHRONIC PAIN

## 2025-04-22 ASSESSMENT — PAIN DESCRIPTION - DESCRIPTORS: DESCRIPTORS: ACHING;TIGHTNESS

## 2025-04-22 ASSESSMENT — PAIN DESCRIPTION - ORIENTATION: ORIENTATION: LEFT

## 2025-04-24 ENCOUNTER — HOSPITAL ENCOUNTER (OUTPATIENT)
Dept: PHYSICAL THERAPY | Age: 75
Setting detail: RECURRING SERIES
Discharge: HOME | End: 2025-04-27
Payer: MEDICARE

## 2025-04-24 PROCEDURE — 97110 THERAPEUTIC EXERCISES: CPT | Performed by: PHYSICAL THERAPIST

## 2025-04-24 ASSESSMENT — PAIN DESCRIPTION - LOCATION: LOCATION: NECK;BACK

## 2025-04-24 ASSESSMENT — PAIN DESCRIPTION - ORIENTATION: ORIENTATION: LEFT

## 2025-04-24 ASSESSMENT — PAIN DESCRIPTION - DESCRIPTORS: DESCRIPTORS: ACHING;TIGHTNESS

## 2025-04-24 ASSESSMENT — PAIN SCALES - GENERAL: PAINLEVEL_OUTOF10: 4

## 2025-04-24 ASSESSMENT — PAIN DESCRIPTION - PAIN TYPE: TYPE: CHRONIC PAIN

## 2025-04-24 NOTE — PROGRESS NOTES
Francesca Bates  : 1950  Primary: Medicare Part A And B (Medicare)  Secondary: [facility] STATE O [facility]  [address]  [address]  [facility] [address]  Phone: [phone]  Fax: [phone] Plan Frequency: 2x/week x 90 days  Plan of Care/Certification Expiration Date: 25        Plan of Care/Certification Expiration Date:  Plan of Care/Certification Expiration Date: 25    Frequency/Duration: Plan Frequency: 2x/week x 90 days      Time In/Out:   Time In: 1556  Time Out: 1645      PT Visit Info:    Total # of Visits to Date: 9      Visit Count:  9    OUTPATIENT PHYSICAL THERAPY:   Treatment Note 2025       Episode  (Bone Health/Balance )               Treatment Diagnosis:    Pain in right hip  Pain of left scapula  Muscle weakness (generalized)  Difficulty in walking, not elsewhere classified  Balance problem  Medical/Referring Diagnosis:    Balance problems    Referring Physician:  Tabby Toro APRN - CNP  MD Orders:  PT Eval and Treat   Return MD Appt:  None scheduled   Date of Onset:  Onset Date: 24     Allergies:   Metoclopramide, Clarithromycin, Erythromycin, and Adhesive tape  Restrictions/Precautions:   None      Interventions Planned (Treatment may consist of any combination of the following):     See Assessment Note    Subjective Comments:   Patient reports she's had little to no pain over the last week.   Initial Pain Level: Left Neck, Back 4/10  Post Session Pain Level: Left  Neck, Back 1/10  Medications Last Reviewed: 2025  Updated Objective Findings:  Neutral pelvic alignment today;  true R leg length discrepancy; Level 2 lift given to place in R shoe.(3.11.25)    Treatment   THERAPEUTIC EXERCISE: (45 minutes):    Exercises per grid below to improve mobility, strength, balance, and coordination.  Required moderate visual, verbal, and tactile cues to promote proper body alignment, promote proper body posture, promote proper body mechanics, and

## 2025-04-29 NOTE — PROGRESS NOTES
Francesca Bates  : 1950  Primary: Medicare Part A And B (Medicare)  Secondary: [facility] STATE O [facility]  [address]  [address]  [facility] [address]  Phone: [phone]  Fax: [phone] Plan Frequency: 2x/week x 90 days  Plan of Care/Certification Expiration Date: 25        Plan of Care/Certification Expiration Date:  Plan of Care/Certification Expiration Date: 25    Frequency/Duration: Plan Frequency: 2x/week x 90 days      Time In/Out:   Time In: 935  Time Out: 102      PT Visit Info:    Total # of Visits to Date: 10      Visit Count:  10    OUTPATIENT PHYSICAL THERAPY:   Treatment Note 2025       Episode  (Bone Health/Balance )               Treatment Diagnosis:    Pain in right hip  Pain of left scapula  Muscle weakness (generalized)  Difficulty in walking, not elsewhere classified  Balance problem  Medical/Referring Diagnosis:    Balance problems    Referring Physician:  Tabby Toro APRN - CNP  MD Orders:  PT Eval and Treat   Return MD Appt:  None scheduled   Date of Onset:  Onset Date: 24     Allergies:   Metoclopramide, Clarithromycin, Erythromycin, and Adhesive tape  Restrictions/Precautions:   None      Interventions Planned (Treatment may consist of any combination of the following):     See Assessment Note    Subjective Comments:   Patient reports increased L side neck and scapular pain, as well as, R side hip pain after last PT visit. Patient reports exercises were too challenging, but she's glad she tried them.   Initial Pain Level: Right, Left Neck, Hip 7/10  Post Session Pain Level: Right, Left  Neck, Hip 2/10  Medications Last Reviewed: 2025  Updated Objective Findings:  L anterior innominate rotation ;true R leg length discrepancy; Level 2 lift given to place in R shoe.(5.2.25)    Treatment   THERAPEUTIC EXERCISE: (30 minutes):    Exercises per grid below to improve mobility, strength, balance, and coordination.  Required moderate

## 2025-05-01 ENCOUNTER — HOSPITAL ENCOUNTER (OUTPATIENT)
Dept: PHYSICAL THERAPY | Age: 75
Setting detail: RECURRING SERIES
End: 2025-05-01
Payer: MEDICARE

## 2025-05-02 ENCOUNTER — HOSPITAL ENCOUNTER (OUTPATIENT)
Dept: PHYSICAL THERAPY | Age: 75
Setting detail: RECURRING SERIES
Discharge: HOME | End: 2025-05-05
Payer: MEDICARE

## 2025-05-02 PROCEDURE — 97140 MANUAL THERAPY 1/> REGIONS: CPT | Performed by: PHYSICAL THERAPIST

## 2025-05-02 PROCEDURE — 97110 THERAPEUTIC EXERCISES: CPT | Performed by: PHYSICAL THERAPIST

## 2025-05-02 ASSESSMENT — PAIN DESCRIPTION - LOCATION: LOCATION: NECK;HIP

## 2025-05-02 ASSESSMENT — PAIN DESCRIPTION - PAIN TYPE: TYPE: CHRONIC PAIN

## 2025-05-02 ASSESSMENT — PAIN SCALES - GENERAL: PAINLEVEL_OUTOF10: 7

## 2025-05-02 ASSESSMENT — PAIN DESCRIPTION - DESCRIPTORS: DESCRIPTORS: ACHING;SORE;TIGHTNESS;SHARP

## 2025-05-02 ASSESSMENT — PAIN DESCRIPTION - ORIENTATION: ORIENTATION: RIGHT;LEFT

## 2025-05-06 NOTE — PROGRESS NOTES
Francesca Bates  : 1950  Primary: Medicare Part A And B (Medicare)  Secondary: [facility] STATE O [facility]  [address]  [address]  [facility] [address]  Phone: [phone]  Fax: [phone] Plan Frequency: 2x/week x 90 days  Plan of Care/Certification Expiration Date: 25        Plan of Care/Certification Expiration Date:  Plan of Care/Certification Expiration Date: 25    Frequency/Duration: Plan Frequency: 2x/week x 90 days      Time In/Out:   Time In: 1640  Time Out: 1730      PT Visit Info:    Total # of Visits to Date: 11  Canceled Appointment: 1      Visit Count:  11    OUTPATIENT PHYSICAL THERAPY:   Treatment Note 2025       Episode  (Bone Health/Balance )               Treatment Diagnosis:    Pain in right hip  Pain of left scapula  Muscle weakness (generalized)  Difficulty in walking, not elsewhere classified  Balance problem  Medical/Referring Diagnosis:    Balance problems    Referring Physician:  Tabby Toro APRN - CNP MD Orders:  PT Eval and Treat   Return MD Appt:  None scheduled   Date of Onset:  Onset Date: 24     Allergies:   Metoclopramide, Clarithromycin, Erythromycin, and Adhesive tape  Restrictions/Precautions:   None      Interventions Planned (Treatment may consist of any combination of the following):     See Assessment Note    Subjective Comments:   Patient reports still having mild L side neck tightness and R side hip pain.   Initial Pain Level: Right, Left Neck, Hip 2/10  Post Session Pain Level: Right, Left  Neck, Hip 0/10  Medications Last Reviewed: 2025  Updated Objective Findings:  L anterior innominate rotation ;true R leg length discrepancy; Level 2 lift given to place in R shoe.(5.8.25)    Treatment   THERAPEUTIC EXERCISE: (30 minutes):    Exercises per grid below to improve mobility, strength, balance, and coordination.  Required moderate visual, verbal, and tactile cues to promote proper body alignment, promote proper

## 2025-05-06 NOTE — THERAPY DISCHARGE
Francesca Bates  : 1950  Primary: Medicare Part A And B (Medicare)  Secondary: [place] [address]  [address]  [facility] [address]  Phone: [phone]  Fax: [phone] Plan Frequency: 2x/week x 90 days  Plan of Care/Certification Expiration Date: 25        Plan of Care/Certification Expiration Date:  Plan of Care/Certification Expiration Date: 25    Frequency/Duration: Plan Frequency: 2x/week x 90 days      Time In/Out:   Time In: 1640  Time Out: 1730      PT Visit Info:    Total # of Visits to Date: 11  Canceled Appointment: 1      Visit Count:  11                OUTPATIENT PHYSICAL THERAPY:             Discharge Summary 2025               Episode (Bone Health/Balance )         Treatment Diagnosis:     Pain in right hip  Pain of left scapula  Muscle weakness (generalized)  Difficulty in walking, not elsewhere classified  Balance problem  Medical/Referring Diagnosis:    Balance problems    Referring Physician:  Tabby Toro APRN - CNP  MD Orders:  PT Eval and Treat   Return MD Appt:  None scheduled  Date of Onset:  Onset Date: 24     Allergies:  Metoclopramide, Clarithromycin, Erythromycin, and Adhesive tape  Restrictions/Precautions:    None      Medications Last Reviewed: 2025     SUBJECTIVE   History of Injury/Illness (Reason for Referral):  Ms. Bates is a 73 yo female referred to physical therapy by Tabby Toro* 2/2 Other abnormalities of gait and mobility [R26.89]. She is currently osteopenic w/ DEXA score values ranging from -1.4- -2.0 as of May 2023. Her PCP notes state a new DEXA scan will be ordered..  Patient reports mild R hip and L scapular pain today. She states she is being sent for an MRI to assess the scapular situation. She denies having any falls, but states her balance has been off a bit.   Patient Stated Goal(s):  \"Improve balance\"  Initial Pain Level:  Right, Left Neck, Hip 2/10   Post Session

## 2025-05-08 ENCOUNTER — HOSPITAL ENCOUNTER (OUTPATIENT)
Dept: PHYSICAL THERAPY | Age: 75
Setting detail: RECURRING SERIES
Discharge: HOME | End: 2025-05-11
Payer: MEDICARE

## 2025-05-08 PROCEDURE — 97140 MANUAL THERAPY 1/> REGIONS: CPT | Performed by: PHYSICAL THERAPIST

## 2025-05-08 PROCEDURE — 97110 THERAPEUTIC EXERCISES: CPT | Performed by: PHYSICAL THERAPIST

## 2025-05-08 ASSESSMENT — PAIN DESCRIPTION - PAIN TYPE: TYPE: CHRONIC PAIN

## 2025-05-08 ASSESSMENT — PAIN DESCRIPTION - LOCATION: LOCATION: NECK;HIP

## 2025-05-08 ASSESSMENT — PAIN DESCRIPTION - DESCRIPTORS: DESCRIPTORS: ACHING;SORE

## 2025-05-08 ASSESSMENT — PAIN DESCRIPTION - ORIENTATION: ORIENTATION: RIGHT;LEFT

## 2025-05-08 ASSESSMENT — PAIN SCALES - GENERAL: PAINLEVEL_OUTOF10: 2

## 2025-05-14 NOTE — PROGRESS NOTES
I am accessing Ms. Bates's chart as a part of our department's internal chart auditing process.  I certify that Ms. Bates is, or was, a patient in our department.  Thank you,  NIKKI MATIAS, PT  5/14/2025

## 2025-08-04 ENCOUNTER — OFFICE VISIT (OUTPATIENT)
Dept: INTERNAL MEDICINE CLINIC | Facility: CLINIC | Age: 75
End: 2025-08-04

## 2025-08-04 VITALS
WEIGHT: 104.6 LBS | SYSTOLIC BLOOD PRESSURE: 110 MMHG | DIASTOLIC BLOOD PRESSURE: 60 MMHG | HEIGHT: 63 IN | BODY MASS INDEX: 18.54 KG/M2

## 2025-08-04 DIAGNOSIS — M89.8X1 PAIN OF LEFT SCAPULA: Primary | ICD-10-CM

## 2025-08-04 RX ORDER — MELOXICAM 7.5 MG/1
7.5 TABLET ORAL DAILY
Qty: 30 TABLET | Refills: 0 | Status: SHIPPED | OUTPATIENT
Start: 2025-08-04

## 2025-08-04 RX ORDER — TIZANIDINE 2 MG/1
2 TABLET ORAL NIGHTLY PRN
Qty: 30 TABLET | Refills: 0 | Status: SHIPPED | OUTPATIENT
Start: 2025-08-04

## 2025-08-13 ENCOUNTER — HOSPITAL ENCOUNTER (EMERGENCY)
Age: 75
Discharge: HOME | End: 2025-08-13
Attending: STUDENT IN AN ORGANIZED HEALTH CARE EDUCATION/TRAINING PROGRAM
Payer: MEDICARE

## 2025-08-13 VITALS
HEART RATE: 88 BPM | OXYGEN SATURATION: 96 % | TEMPERATURE: 98.2 F | RESPIRATION RATE: 16 BRPM | BODY MASS INDEX: 18.89 KG/M2 | WEIGHT: 106.6 LBS | HEIGHT: 63 IN | DIASTOLIC BLOOD PRESSURE: 74 MMHG | SYSTOLIC BLOOD PRESSURE: 112 MMHG
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
## 2025-08-13 DIAGNOSIS — R51.9 ACUTE NONINTRACTABLE HEADACHE, UNSPECIFIED HEADACHE TYPE: ICD-10-CM

## 2025-08-13 PROCEDURE — 99282 EMERGENCY DEPT VISIT SF MDM: CPT

## 2025-08-13 ASSESSMENT — PAIN - FUNCTIONAL ASSESSMENT: PAIN_FUNCTIONAL_ASSESSMENT: 0-10

## 2025-08-13 ASSESSMENT — PAIN DESCRIPTION - DESCRIPTORS: DESCRIPTORS: THROBBING

## 2025-08-13 ASSESSMENT — PAIN SCALES - GENERAL: PAINLEVEL_OUTOF10: 7

## 2025-08-13 ASSESSMENT — PAIN DESCRIPTION - LOCATION: LOCATION: HAND

## 2025-08-14 ENCOUNTER — CARE COORDINATION (OUTPATIENT)
Dept: CARE COORDINATION | Facility: CLINIC | Age: 75
End: 2025-08-14

## 2025-08-21 ENCOUNTER — OFFICE VISIT (OUTPATIENT)
Dept: ORTHOPEDIC SURGERY | Age: 75
End: 2025-08-21
Payer: MEDICARE

## 2025-08-21 DIAGNOSIS — M54.12 CERVICAL RADICULOPATHY: ICD-10-CM

## 2025-08-21 DIAGNOSIS — M47.812 SPONDYLOSIS OF CERVICAL SPINE: ICD-10-CM

## 2025-08-21 DIAGNOSIS — M25.512 ACUTE PAIN OF LEFT SHOULDER: Primary | ICD-10-CM

## 2025-08-21 PROCEDURE — G2211 COMPLEX E/M VISIT ADD ON: HCPCS | Performed by: PHYSICIAN ASSISTANT

## 2025-08-21 PROCEDURE — G8420 CALC BMI NORM PARAMETERS: HCPCS | Performed by: PHYSICIAN ASSISTANT

## 2025-08-21 PROCEDURE — 99204 OFFICE O/P NEW MOD 45 MIN: CPT | Performed by: PHYSICIAN ASSISTANT

## 2025-08-21 PROCEDURE — G8399 PT W/DXA RESULTS DOCUMENT: HCPCS | Performed by: PHYSICIAN ASSISTANT

## 2025-08-21 PROCEDURE — 1123F ACP DISCUSS/DSCN MKR DOCD: CPT | Performed by: PHYSICIAN ASSISTANT

## 2025-08-21 PROCEDURE — G8428 CUR MEDS NOT DOCUMENT: HCPCS | Performed by: PHYSICIAN ASSISTANT

## 2025-08-21 PROCEDURE — 1036F TOBACCO NON-USER: CPT | Performed by: PHYSICIAN ASSISTANT

## 2025-08-21 PROCEDURE — 1090F PRES/ABSN URINE INCON ASSESS: CPT | Performed by: PHYSICIAN ASSISTANT

## 2025-08-21 RX ORDER — GABAPENTIN 300 MG/1
300 CAPSULE ORAL NIGHTLY
Qty: 30 CAPSULE | Refills: 2 | Status: SHIPPED | OUTPATIENT
Start: 2025-08-21 | End: 2025-11-19

## 2025-08-24 ENCOUNTER — PATIENT MESSAGE (OUTPATIENT)
Dept: ORTHOPEDIC SURGERY | Age: 75
End: 2025-08-24

## 2025-08-25 RX ORDER — GABAPENTIN 100 MG/1
100-200 CAPSULE ORAL
Qty: 90 CAPSULE | Refills: 0 | Status: SHIPPED | OUTPATIENT
Start: 2025-08-25 | End: 2025-10-09

## 2025-08-27 ENCOUNTER — HOSPITAL ENCOUNTER (OUTPATIENT)
Dept: PHYSICAL THERAPY | Age: 75
Setting detail: RECURRING SERIES
Discharge: HOME | End: 2025-08-30
Payer: MEDICARE

## 2025-08-27 DIAGNOSIS — M25.512 ACUTE PAIN OF LEFT SHOULDER: ICD-10-CM

## 2025-08-27 DIAGNOSIS — M54.2 NECK PAIN: Primary | ICD-10-CM

## 2025-08-27 PROCEDURE — 97161 PT EVAL LOW COMPLEX 20 MIN: CPT

## 2025-08-27 PROCEDURE — 97110 THERAPEUTIC EXERCISES: CPT

## 2025-08-27 PROCEDURE — 97140 MANUAL THERAPY 1/> REGIONS: CPT

## 2025-08-27 ASSESSMENT — PAIN SCALES - GENERAL: PAINLEVEL_OUTOF10: 2

## 2025-09-02 DIAGNOSIS — M89.8X1 PAIN OF LEFT SCAPULA: ICD-10-CM

## 2025-09-02 RX ORDER — MELOXICAM 7.5 MG/1
7.5 TABLET ORAL DAILY
Qty: 90 TABLET | Refills: 2 | Status: SHIPPED | OUTPATIENT
Start: 2025-09-02

## 2025-09-03 ENCOUNTER — HOSPITAL ENCOUNTER (OUTPATIENT)
Dept: PHYSICAL THERAPY | Age: 75
Setting detail: RECURRING SERIES
Discharge: HOME | End: 2025-09-06
Payer: MEDICARE

## 2025-09-03 PROCEDURE — 97110 THERAPEUTIC EXERCISES: CPT

## 2025-09-03 PROCEDURE — 97140 MANUAL THERAPY 1/> REGIONS: CPT

## 2025-09-03 ASSESSMENT — PAIN SCALES - GENERAL: PAINLEVEL_OUTOF10: 3
